# Patient Record
Sex: FEMALE | Race: WHITE | NOT HISPANIC OR LATINO | Employment: FULL TIME | URBAN - METROPOLITAN AREA
[De-identification: names, ages, dates, MRNs, and addresses within clinical notes are randomized per-mention and may not be internally consistent; named-entity substitution may affect disease eponyms.]

---

## 2017-08-31 ENCOUNTER — HOSPITAL ENCOUNTER (OUTPATIENT)
Facility: MEDICAL CENTER | Age: 47
End: 2017-08-31
Attending: NURSE PRACTITIONER
Payer: COMMERCIAL

## 2017-08-31 ENCOUNTER — OFFICE VISIT (OUTPATIENT)
Dept: MEDICAL GROUP | Facility: PHYSICIAN GROUP | Age: 47
End: 2017-08-31
Payer: COMMERCIAL

## 2017-08-31 VITALS
BODY MASS INDEX: 27.85 KG/M2 | OXYGEN SATURATION: 95 % | SYSTOLIC BLOOD PRESSURE: 116 MMHG | HEIGHT: 69 IN | TEMPERATURE: 98.7 F | DIASTOLIC BLOOD PRESSURE: 62 MMHG | HEART RATE: 82 BPM | WEIGHT: 188 LBS

## 2017-08-31 DIAGNOSIS — K90.0 CELIAC DISEASE: ICD-10-CM

## 2017-08-31 DIAGNOSIS — Z76.89 ENCOUNTER TO ESTABLISH CARE: ICD-10-CM

## 2017-08-31 DIAGNOSIS — K92.1 BLOOD IN STOOL: ICD-10-CM

## 2017-08-31 DIAGNOSIS — R39.89 ABNORMAL URINE COLOR: ICD-10-CM

## 2017-08-31 DIAGNOSIS — Z00.00 PREVENTATIVE HEALTH CARE: ICD-10-CM

## 2017-08-31 LAB
APPEARANCE UR: CLEAR
BILIRUB UR STRIP-MCNC: NORMAL MG/DL
COLOR UR AUTO: YELLOW
GLUCOSE UR STRIP.AUTO-MCNC: NORMAL MG/DL
KETONES UR STRIP.AUTO-MCNC: NORMAL MG/DL
LEUKOCYTE ESTERASE UR QL STRIP.AUTO: NORMAL
NITRITE UR QL STRIP.AUTO: NORMAL
PH UR STRIP.AUTO: 7.5 [PH] (ref 5–8)
PROT UR QL STRIP: NORMAL MG/DL
RBC UR QL AUTO: NORMAL
SP GR UR STRIP.AUTO: 1
UROBILINOGEN UR STRIP-MCNC: NORMAL MG/DL

## 2017-08-31 PROCEDURE — 99205 OFFICE O/P NEW HI 60 MIN: CPT | Performed by: NURSE PRACTITIONER

## 2017-08-31 PROCEDURE — 81002 URINALYSIS NONAUTO W/O SCOPE: CPT | Performed by: NURSE PRACTITIONER

## 2017-08-31 PROCEDURE — 82274 ASSAY TEST FOR BLOOD FECAL: CPT

## 2017-08-31 NOTE — PROGRESS NOTES
Chief Complaint   Patient presents with   • Stool Color Change     brown urine/trouble loosing weight/blood in stool-marbella color//feels nausea after eating x3 days     Britt Bethea is a 46 y.o. female here to establish care and for evaluation and management of the following:  HPI:    Previous PCP [place], relocated in May  Has been having trouble with weight loss. Tried hcg injections and diet for about 6 weeks with local office, not helpful. Weight then was about 203 Also tried medication but did not tolerate. Meds. Having trouble losing weight. Cross fit every night, very healthy eating. 7 months without exercise and with stress of moving. She gained about 40 pounds during that time. Today 186.8 at home this morning. 145 is ideal, was around 150 before move. Weight in 170's about 5 years ago told she was insulin resistant. She started weight loss meal replacement but stopped the other day when she discovered it was not gluten free. She's been having some blood in stool since Monday. Small amount on paper. Denies black tarry stool, NSAID use.  Current medicines (including changes today)  No current outpatient prescriptions on file.     No current facility-administered medications for this visit.      She  has a past medical history of Celiac disease and History of insulin resistance (2012).  She  has a past surgical history that includes hysterectomy, vaginal (2010) and appendectomy (1999).  Social History     Social History   • Marital status:      Spouse name: N/A   • Number of children: 3   • Years of education: N/A     Occupational History   • [employer]     Social History Main Topics   • Smoking status: Never Smoker   • Smokeless tobacco: Never Used   • Alcohol use No   • Drug use: No   • Sexual activity: Yes     Partners: Male     Other Topics Concern   • Not on file     Social History Narrative   • No narrative on file     History reviewed. No pertinent family history.  No family  "status information on file.       ROS  Constitutional: Negative for fever, chills, and unexplained weight loss.   HENT: Negative for ear pain, nosebleeds, and sore throat.  Eyes: Negative for changes in vision.   Respiratory: Negative for increased cough, sputum production, or wheezing.   Cardiovascular: Negative for chest pain, palpitations.   Gastrointestinal: Negative for constipation, diarrhea.   Genitourinary: Negative for dysuria.   Musculoskeletal: Negative for unusual joint pain or myalgias.   Skin: Negative for rash and itching.   Neurological: Negative for dizziness, tremors, focal weakness.   All other systems reviewed and are negative except as in HPI.    Health maintenance reviewed and updated. Records requested, mammogram prior to relocating.     Objective:   Blood pressure 116/62, pulse 82, temperature 37.1 °C (98.7 °F), height 1.753 m (5' 9\"), weight 85.3 kg (188 lb), SpO2 95 %. Body mass index is 27.76 kg/m².  Physical Exam:  Constitutional: Alert, oriented, in no acute distress.  Pleasant and cooperative with the examination.  Psych: Eye contact is good, speech goal directed, affect calm, normal judgement and insight.  Skin: Warm, dry, no rashes in visible areas.  HEENT: PER, conjunctiva and sclera clear.  Nares patent with no significant congestion or drainage.  Neck: Supple, trachea midline.   Lungs: Normal effort and respirations. Clear to auscultation bilaterally.  Abdomen: Soft, non-tender, no palpable masses. No CVAT  CV: Regular rate and rhythm.  Lower extremities: no edema, pulses intact.  Neurological:  Grossly intact.        Assessment and Plan:   The following treatment plan was discussed      1. Abnormal urine color    - POCT Urinalysis - no abnormalities    2. Blood in stool  Differential diagnosis, natural history, treatment options, supportive care, and indications for immediate follow-up discussed at length.   - REFERRAL TO GASTROENTEROLOGY  - OCCULT BLOOD FECES IMMUNOASSAY; " Future    3. Celiac disease  Strict gluten free diet.  - REFERRAL TO GASTROENTEROLOGY    4. Preventative health care    - VITAMIN D,25 HYDROXY; Future  - CBC WITH DIFFERENTIAL; Future  - LIPID PROFILE; Future  - COMP METABOLIC PANEL; Future  - TSH WITH REFLEX TO FT4; Future    5. Encounter to establish care    Records reviewed and/or requested.   Followup: Return if symptoms worsen or fail to improve.  Sooner if needed or with any new problems.       60 minutes was spent with this patient and greater than 50% of this time was spent in face to face review, consultation, counseling, and arranging future evaluation and follow up of medical conditions and care. Questions were answered regarding her diagnoses in regards to current management, specialty providers, symptom control, and future planning.   Take all medications as directed.  Report any side effects of medications.   Report any new symptoms.  Follow up as advised.  Have labs or other studies as ordered done as directed prior to follow up.  Please keep all appointments for any referrals given.    Please note this dictation was created using voice recognition software. Every reasonable attempt has been made to correct obvious errors, however there may be errors of grammar and possibly content that were not discovered before finalizing the note.

## 2017-09-01 ENCOUNTER — HOSPITAL ENCOUNTER (OUTPATIENT)
Dept: LAB | Facility: MEDICAL CENTER | Age: 47
End: 2017-09-01
Attending: NURSE PRACTITIONER
Payer: COMMERCIAL

## 2017-09-01 DIAGNOSIS — Z00.00 PREVENTATIVE HEALTH CARE: ICD-10-CM

## 2017-09-01 LAB
25(OH)D3 SERPL-MCNC: 29 NG/ML (ref 30–100)
ALBUMIN SERPL BCP-MCNC: 4.5 G/DL (ref 3.2–4.9)
ALBUMIN/GLOB SERPL: 1.4 G/DL
ALP SERPL-CCNC: 81 U/L (ref 30–99)
ALT SERPL-CCNC: 21 U/L (ref 2–50)
ANION GAP SERPL CALC-SCNC: 7 MMOL/L (ref 0–11.9)
AST SERPL-CCNC: 18 U/L (ref 12–45)
BASOPHILS # BLD AUTO: 1.2 % (ref 0–1.8)
BASOPHILS # BLD: 0.09 K/UL (ref 0–0.12)
BILIRUB SERPL-MCNC: 0.4 MG/DL (ref 0.1–1.5)
BUN SERPL-MCNC: 13 MG/DL (ref 8–22)
CALCIUM SERPL-MCNC: 10 MG/DL (ref 8.5–10.5)
CHLORIDE SERPL-SCNC: 105 MMOL/L (ref 96–112)
CHOLEST SERPL-MCNC: 169 MG/DL (ref 100–199)
CO2 SERPL-SCNC: 26 MMOL/L (ref 20–33)
CREAT SERPL-MCNC: 0.74 MG/DL (ref 0.5–1.4)
EOSINOPHIL # BLD AUTO: 0.61 K/UL (ref 0–0.51)
EOSINOPHIL NFR BLD: 8.3 % (ref 0–6.9)
ERYTHROCYTE [DISTWIDTH] IN BLOOD BY AUTOMATED COUNT: 48.2 FL (ref 35.9–50)
FASTING STATUS PATIENT QL REPORTED: NORMAL
GFR SERPL CREATININE-BSD FRML MDRD: >60 ML/MIN/1.73 M 2
GLOBULIN SER CALC-MCNC: 3.2 G/DL (ref 1.9–3.5)
GLUCOSE SERPL-MCNC: 84 MG/DL (ref 65–99)
HCT VFR BLD AUTO: 47.3 % (ref 37–47)
HDLC SERPL-MCNC: 40 MG/DL
HGB BLD-MCNC: 15.4 G/DL (ref 12–16)
IMM GRANULOCYTES # BLD AUTO: 0.02 K/UL (ref 0–0.11)
IMM GRANULOCYTES NFR BLD AUTO: 0.3 % (ref 0–0.9)
LDLC SERPL CALC-MCNC: 111 MG/DL
LYMPHOCYTES # BLD AUTO: 2.6 K/UL (ref 1–4.8)
LYMPHOCYTES NFR BLD: 35.3 % (ref 22–41)
MCH RBC QN AUTO: 30.9 PG (ref 27–33)
MCHC RBC AUTO-ENTMCNC: 32.6 G/DL (ref 33.6–35)
MCV RBC AUTO: 94.8 FL (ref 81.4–97.8)
MONOCYTES # BLD AUTO: 0.52 K/UL (ref 0–0.85)
MONOCYTES NFR BLD AUTO: 7.1 % (ref 0–13.4)
NEUTROPHILS # BLD AUTO: 3.52 K/UL (ref 2–7.15)
NEUTROPHILS NFR BLD: 47.8 % (ref 44–72)
NRBC # BLD AUTO: 0 K/UL
NRBC BLD AUTO-RTO: 0 /100 WBC
PLATELET # BLD AUTO: 258 K/UL (ref 164–446)
PMV BLD AUTO: 11.7 FL (ref 9–12.9)
POTASSIUM SERPL-SCNC: 4.1 MMOL/L (ref 3.6–5.5)
PROT SERPL-MCNC: 7.7 G/DL (ref 6–8.2)
RBC # BLD AUTO: 4.99 M/UL (ref 4.2–5.4)
SODIUM SERPL-SCNC: 138 MMOL/L (ref 135–145)
TRIGL SERPL-MCNC: 92 MG/DL (ref 0–149)
TSH SERPL DL<=0.005 MIU/L-ACNC: 1.41 UIU/ML (ref 0.3–3.7)
WBC # BLD AUTO: 7.4 K/UL (ref 4.8–10.8)

## 2017-09-01 PROCEDURE — 80061 LIPID PANEL: CPT

## 2017-09-01 PROCEDURE — 82306 VITAMIN D 25 HYDROXY: CPT

## 2017-09-01 PROCEDURE — 84443 ASSAY THYROID STIM HORMONE: CPT

## 2017-09-01 PROCEDURE — 80053 COMPREHEN METABOLIC PANEL: CPT

## 2017-09-01 PROCEDURE — 36415 COLL VENOUS BLD VENIPUNCTURE: CPT

## 2017-09-01 PROCEDURE — 85025 COMPLETE CBC W/AUTO DIFF WBC: CPT

## 2017-09-03 DIAGNOSIS — K92.1 BLOOD IN STOOL: ICD-10-CM

## 2017-09-03 LAB — HEMOCCULT STL QL IA: NEGATIVE

## 2018-01-30 ENCOUNTER — OFFICE VISIT (OUTPATIENT)
Dept: MEDICAL GROUP | Facility: PHYSICIAN GROUP | Age: 48
End: 2018-01-30
Payer: COMMERCIAL

## 2018-01-30 VITALS
DIASTOLIC BLOOD PRESSURE: 68 MMHG | HEIGHT: 69 IN | OXYGEN SATURATION: 97 % | RESPIRATION RATE: 16 BRPM | SYSTOLIC BLOOD PRESSURE: 118 MMHG | HEART RATE: 64 BPM | WEIGHT: 202 LBS | TEMPERATURE: 98.1 F | BODY MASS INDEX: 29.92 KG/M2

## 2018-01-30 DIAGNOSIS — F98.8 ADD (ATTENTION DEFICIT DISORDER) WITHOUT HYPERACTIVITY: ICD-10-CM

## 2018-01-30 PROCEDURE — 99215 OFFICE O/P EST HI 40 MIN: CPT | Performed by: NURSE PRACTITIONER

## 2018-01-30 RX ORDER — LISDEXAMFETAMINE DIMESYLATE CAPSULES 30 MG/1
30 CAPSULE ORAL EVERY MORNING
Qty: 30 CAP | Refills: 0 | Status: SHIPPED | OUTPATIENT
Start: 2018-01-30 | End: 2018-03-01

## 2018-01-30 ASSESSMENT — PATIENT HEALTH QUESTIONNAIRE - PHQ9: CLINICAL INTERPRETATION OF PHQ2 SCORE: 0

## 2018-01-30 NOTE — LETTER
[facility]  CRISTY Suárez  [address]  [place] [address]  Fax: [phone]   Authorization for Release/Disclosure of   Protected Health Information   Name: CASTILLO COPPOLA : 1970 SSN: [id number]   Address: [address] [place] [address]  [place] [address] Phone:    [phone] (home)    I authorize the entity listed below to release/disclose the PHI below to:   [facility]/CRISTY Suárez and CRISTY Suárez   Provider or Entity Name:     Address   City, State, Zip   Phone:      Fax:     Reason for request: continuity of care   Information to be released:    [  ] LAST COLONOSCOPY,  including any PATH REPORT and follow-up  [  ] LAST FIT/COLOGUARD RESULT [  ] LAST DEXA  [  ] LAST MAMMOGRAM  [  ] LAST PAP  [  ] LAST LABS [  ] RETINA EXAM REPORT  [  ] IMMUNIZATION RECORDS  [  ] Release all info      [  ] Check here and initial the line next to each item to release ALL health information INCLUDING  _____ Care and treatment for drug and / or alcohol abuse  _____ HIV testing, infection status, or AIDS  _____ Genetic Testing    DATES OF SERVICE OR TIME PERIOD TO BE DISCLOSED: _____________  I understand and acknowledge that:  * This Authorization may be revoked at any time by you in writing, except if your health information has already been used or disclosed.  * Your health information that will be used or disclosed as a result of you signing this authorization could be re-disclosed by the recipient. If this occurs, your re-disclosed health information may no longer be protected by State or Federal laws.  * You may refuse to sign this Authorization. Your refusal will not affect your ability to obtain treatment.  * This Authorization becomes effective upon signing and will  on (date) __________.      If no date is indicated, this Authorization will  one (1) year from the signature date.    Name: Castillo Coppola    Signature:   Date:     2018            PLEASE FAX REQUESTED RECORDS BACK TO: [phone]

## 2018-01-30 NOTE — PROGRESS NOTES
"Subjective:     Chief Complaint   Patient presents with   • Other     difficulty focusing      Britt Bethea is a 47 y.o. female here today for evaluation and management of issues listed below.    She has been having trouble concentrating at work and this has been affecting the quality of her work. She has been forgetting things and had more trouble completing tasks. Stress at work has been increasing over the last 3 months or so. The increasing stress makes it more difficult to stay organized and get things done. This has become problematic since the  stress at work escalated. Started working in May and managed fine until about September/October. She also has noticed things at home are also not well managed. She was diagnosed with ADD by previous PCP when she was working on graduate degree (her son also diagnosed at the time).   Sleep and appetite unchanged. She was treated with Vyvanse and responded well to medication without adverse effects. She hasn't had any medication for the last few years.     1. ADD (attention deficit disorder) without hyperactivity        ROS   Denies HA, chest pain, increased shortness of breath, abdominal pain, bladder or bowel changes, lower extremity edema. All other pertinent systems reviewed and are negative except as in HPI.    Allergies: Patient has no known allergies.  Current medicines (including changes today)  Current Outpatient Prescriptions   Medication Sig Dispense Refill   • lisdexamfetamine (VYVANSE) 30 MG capsule Take 1 Cap by mouth every morning for 30 days. 30 Cap 0     No current facility-administered medications for this visit.        She  has a past medical history of Celiac disease and History of insulin resistance (2012).      Health Maintenance: Reviewed and updated.    Objective:   Blood pressure 118/68, pulse 64, temperature 36.7 °C (98.1 °F), resp. rate 16, height 1.753 m (5' 9\"), weight 91.6 kg (202 lb), SpO2 97 %. Body mass index is 29.83 kg/m².  Physical " Exam   Constitutional: Non-toxic appearance. Alert, no acute distress. Appears well-developed and well-nourished. Pleasant and cooperative with the examination.  Eye contact is good, affect calm.  HENT:   Head: Normocephalic.   Skin: Warm, dry, no rashes in visible areas. No diaphoresis.    Eyes: Pupils equal, round, reactive to light. Conjunctivae and sclera clear, lids normal.  ENT: Nose normal. Pinna normal.  External auditory canals normal, TM's intact.  Uvula midline. No posterior oropharyngeal erythema or edema.  Neck: Supple, trachea midline.  Lungs: Normal effort and respirations. No accessory muscle usage. Clear to auscultation bilaterally.   Abdomen: No CVAT   CV: Regular rate and rhythm.  MS/Ext: Steady gait, no edema.  Vitals reviewed.       Results reviewed:  ASRS completed and reviewed  Assessment and Plan:   Treatment:    Britt was seen today for other.    Diagnoses and all orders for this visit:    ADD (attention deficit disorder) without hyperactivity  Not well controlled. natural history, treatment options, supportive care, and indications for immediate follow-up discussed at length.  reviewed. Records requested. Trial of medication and follow up one month. Reviewed risks and benefits of medication.     -     lisdexamfetamine (VYVANSE) 30 MG capsule; Take 1 Cap by mouth every morning for 30 days.    Other orders  -     Obtain Results: Other (see comment) (office notes); Obtain Results From: Other (see comment) ([facility], [facility]        Followup: Return in about 1 month (around 2/28/2018). Sooner should new symptoms or problems arise, or as previously scheduled.     40 minutes face to face time with the patient during this encounter and greater than 50% of this time involved  review, consultation, counseling, education, and arranging future evaluation and treatment of medical conditions and care. Questions were answered regarding her diagnoses in regards to current  management, specialty providers, symptom control, and future planning.      Reviewed side effects, risks, and benefits of medications prescribed today.  Advised to take all medications as instructed and report any side effects.   The patient voices understanding and agrees.  Report any new or worsening symptoms.  Have labs or other diagnostic studies prior to follow up.  Keep all appointments for any referrals given.        Please note this dictation was created using voice recognition software. Every reasonable attempt has been made to correct obvious errors, however there may be errors of grammar and possibly content that were not discovered before finalizing the note.

## 2018-02-28 ENCOUNTER — OFFICE VISIT (OUTPATIENT)
Dept: MEDICAL GROUP | Facility: LAB | Age: 48
End: 2018-02-28
Payer: COMMERCIAL

## 2018-02-28 VITALS
OXYGEN SATURATION: 96 % | DIASTOLIC BLOOD PRESSURE: 78 MMHG | TEMPERATURE: 98.3 F | HEIGHT: 69 IN | WEIGHT: 195 LBS | HEART RATE: 111 BPM | RESPIRATION RATE: 12 BRPM | BODY MASS INDEX: 28.88 KG/M2 | SYSTOLIC BLOOD PRESSURE: 98 MMHG

## 2018-02-28 DIAGNOSIS — F98.8 ADD (ATTENTION DEFICIT DISORDER) WITHOUT HYPERACTIVITY: ICD-10-CM

## 2018-02-28 DIAGNOSIS — Z12.31 ENCOUNTER FOR SCREENING MAMMOGRAM FOR BREAST CANCER: ICD-10-CM

## 2018-02-28 PROCEDURE — 99214 OFFICE O/P EST MOD 30 MIN: CPT | Performed by: NURSE PRACTITIONER

## 2018-02-28 RX ORDER — LISDEXAMFETAMINE DIMESYLATE CAPSULES 70 MG/1
70 CAPSULE ORAL EVERY MORNING
Qty: 30 CAP | Refills: 0 | Status: SHIPPED | OUTPATIENT
Start: 2018-03-01 | End: 2018-03-16 | Stop reason: SDUPTHER

## 2018-02-28 NOTE — PROGRESS NOTES
"Chief Complaint   Patient presents with   • ADHD     pt would like to take the higher dose, she had previously been on 70 mg was started at a lower dose        HPI  Britt is a 47-year-old female, same day access of [employer]. She is here to follow-up on reinitiation of Vyvanse - this is a new issue to me today. Chronic history of attention deficit disorder. Was previously on Vyvanse 2 yrs ago during her master's program - worked very well then.  Went off Vyvanse b/c of job situation (low stress), moved out here last May and now is a very stressful / high demanding job.  Was started on 30 mg Vyvanse, use to take 70 mg.  Still feeling overwhelmed in particular situations at work and would like to increase to 70 mg of Vyvanse. On 30 mg of Vyvanse she states that she is not feeling as overwhelmed at home - able to organize her life after work instead of \"veg out.\"  Sleeping ok.  Denies insomnia, heart palpitations.    Has 3 children and father with ADD.        Past medical, surgical, family, and social history is reviewed and updated in Epic chart by me today.   Medications and allergies reviewed and updated in Epic chart by me today.     ROS:   As documented in history of present illness above    Exam:  Blood pressure (!) 98/78, pulse (!) 111, temperature 36.8 °C (98.3 °F), resp. rate 12, height 1.753 m (5' 9\"), weight 88.5 kg (195 lb), SpO2 96 %.  Gen. appears healthy in no distress   Skin appropriate for sex and age   HEENT unremarkable   Neck no adenopathy, no nodules or masses palpable   Lungs clear   Heart regular   Extremities no edema   Neuro gait and station normal   Psych appropriate, calm, interactive and well groomed      A/P:  1. ADD (attention deficit disorder) without hyperactivity  -She was given a 30 day refill of Vyvanse. Per her  profile, last refill was on January 30 by April. Follow-up was made for the patient for 30 days with April.  - lisdexamfetamine (VYVANSE) 70 MG capsule; Take 1 Cap " by mouth every morning for 30 days.  Dispense: 30 Cap; Refill: 0    2. Encounter for screening mammogram for breast cancer  -Recommend updated mammogram.  - MA-SCREEN MAMMO W/CAD-BILAT; Future

## 2018-03-15 ENCOUNTER — TELEPHONE (OUTPATIENT)
Dept: MEDICAL GROUP | Facility: PHYSICIAN GROUP | Age: 48
End: 2018-03-15

## 2018-03-15 NOTE — TELEPHONE ENCOUNTER
Future Appointments       Provider Department Center    3/16/2018 10:05 AM Chuyita Genao P.A.-C. [facility]    3/23/2018 3:00 PM RUPERT HAIR 1 [facility] MAMMOGRAPHY [facility] PATIENT PRE-VISIT PLANNING     Note: Patient will not be contacted if there is no indication to call.     1.  Reviewed notes from the last few office visits within the medical group: Yes    2.  If any orders were placed at last visit or intended to be done for this visit (i.e. 6 mos follow-up), do we have Results/Consult Notes?        •  Labs - Labs were not ordered at last office visit.       •  Imaging - Imaging ordered, NOT completed. Patient advised to complete prior to next appointment. MAMMO scheduled        •  Referrals - No referrals were ordered at last office visit.    3. Is this appointment scheduled as a Hospital Follow-Up? No    4.  Immunizations were updated in Epic using WebIZ?: Yes       •  Web Iz Recommendations: FLU and TDAP    5.  Patient is due for the following Health Maintenance Topics:   Health Maintenance Due   Topic Date Due   • IMM DTaP/Tdap/Td Vaccine (1 - Tdap) 10/20/1991   • PAP SMEAR  10/20/1993   • MAMMOGRAM  10/20/2012   • IMM INFLUENZA (1) 09/01/2017       6.  MDX printed and highlighted for Provider? N\A BCBS     7.  Patient was informed to arrive 15 min prior to their scheduled appointment and bring in their medication bottles.

## 2018-03-16 ENCOUNTER — OFFICE VISIT (OUTPATIENT)
Dept: MEDICAL GROUP | Facility: PHYSICIAN GROUP | Age: 48
End: 2018-03-16
Payer: COMMERCIAL

## 2018-03-16 VITALS
HEART RATE: 97 BPM | BODY MASS INDEX: 29.92 KG/M2 | OXYGEN SATURATION: 96 % | SYSTOLIC BLOOD PRESSURE: 100 MMHG | HEIGHT: 69 IN | DIASTOLIC BLOOD PRESSURE: 70 MMHG | TEMPERATURE: 99 F | WEIGHT: 202 LBS

## 2018-03-16 DIAGNOSIS — F90.9 ADULT ADHD (ATTENTION DEFICIT HYPERACTIVITY DISORDER): ICD-10-CM

## 2018-03-16 PROCEDURE — 99214 OFFICE O/P EST MOD 30 MIN: CPT | Performed by: PHYSICIAN ASSISTANT

## 2018-03-16 RX ORDER — LISDEXAMFETAMINE DIMESYLATE CAPSULES 70 MG/1
70 CAPSULE ORAL EVERY MORNING
Qty: 30 CAP | Refills: 0 | Status: SHIPPED | OUTPATIENT
Start: 2018-05-01 | End: 2018-05-31

## 2018-03-16 RX ORDER — LISDEXAMFETAMINE DIMESYLATE CAPSULES 70 MG/1
70 CAPSULE ORAL EVERY MORNING
Qty: 30 CAP | Refills: 0 | Status: SHIPPED | OUTPATIENT
Start: 2018-05-31 | End: 2018-06-22 | Stop reason: SDUPTHER

## 2018-03-16 RX ORDER — LISDEXAMFETAMINE DIMESYLATE CAPSULES 70 MG/1
70 CAPSULE ORAL EVERY MORNING
Qty: 30 CAP | Refills: 0 | Status: SHIPPED | OUTPATIENT
Start: 2018-04-01 | End: 2018-05-01

## 2018-03-16 NOTE — PROGRESS NOTES
Subjective:   Britt Bethea is a 47 y.o. female here today for ADHD follow-up. Is an established patient of [facility].    HPI:    Adult ADHD (attention deficit hyperactivity disorder)  Patient presents to the office today for a follow-up on ADHD. This is a patient of April Zuluaga. This is my first time seeing patient for this issue. Britt states that she first started taking Vyvanse during her master's program. She was feeling very overwhelmed with all of the studying and felt unable to cope or concentrate on anything. The medication worked very well for her and she used it for 2 years. Her insurance then stop paying for the medication so she stopped it. 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 She states that her job is very demanding with lots of complicated documentation which she was having problems doing. Her PCP put her back on the Vyvanse at the end of January at a 30 mg dose. She was increased to the 70 mg dose that she was on previously at the end of last month and she is here today for refills. She states that she is tolerating the medication very well and that it works quite well for her symptoms. It allows her to do her job in the way that it needs to be done. The only side effect that she has had is a bit of fogginess first thing in the morning. She denies any excessive sedation, dizziness, chest pain, palpitations, or headaches. She is currently taking the Vyvanse first thing in the morning on her way to work, typically between 5:45 and 6 AM. She endorses a strong family history of ADHD. Both her son and daughter are actually on Vyvanse and her father has ADHD as well.       Current medicines (including changes today)  Current Outpatient Prescriptions   Medication Sig Dispense Refill  "  • [START ON 4/1/2018] lisdexamfetamine (VYVANSE) 70 MG capsule Take 1 Cap by mouth every morning for 30 days. 30 Cap 0   • [START ON 5/1/2018] lisdexamfetamine (VYVANSE) 70 MG capsule Take 1 Cap by mouth every morning for 30 days. 30 Cap 0   • [START ON 5/31/2018] lisdexamfetamine (VYVANSE) 70 MG capsule Take 1 Cap by mouth every morning for 30 days. 30 Cap 0     No current facility-administered medications for this visit.      She  has a past medical history of Celiac disease and History of insulin resistance (2012).    ROS  As per HPI.       Objective:     Blood pressure 100/70, pulse 97, temperature 37.2 °C (99 °F), height 1.753 m (5' 9\"), weight 91.6 kg (202 lb), SpO2 96 %. Body mass index is 29.83 kg/m².     Physical Exam:  Constitutional: Alert, well-appearing, no distress.  Skin: Warm, dry, good turgor, no rashes in visible areas.  Eye: Pupils are equal and round, conjunctiva clear, lids normal.  ENMT: Lips without lesions, moist mucus membranes.  Neck: No masses. No submandibular or cervical lymphadenopathy. No palpable thyromegaly.  Respiratory: Unlabored respiratory effort, lungs clear to auscultation, no wheezes, no rhonchi.  Cardiovascular: Normal S1, S2, no murmur.    Assessment and Plan:   The following treatment plan was discussed    1. Adult ADHD (attention deficit hyperactivity disorder)  Chronic issue, well-controlled with daily Vyvanse. Patient doing well on medication with minimal side effects. Mer reviewed: last fill date on 3/2/18--#30 with no refills. 3 scripts provided to patient with 30 day supply on each. Able to fill first script on 4/1/18. Should follow up again for ADHD with PCP in 3 months for further refills.  - lisdexamfetamine (VYVANSE) 70 MG capsule; Take 1 Cap by mouth every morning for 30 days.  Dispense: 30 Cap; Refill: 0  - lisdexamfetamine (VYVANSE) 70 MG capsule; Take 1 Cap by mouth every morning for 30 days.  Dispense: 30 Cap; Refill: 0  - lisdexamfetamine (VYVANSE) " 70 MG capsule; Take 1 Cap by mouth every morning for 30 days.  Dispense: 30 Cap; Refill: 0      Followup: Return in about 3 months (around 6/16/2018) for f/u ADHD; Short.    Chuyita Genao P.A.-C.

## 2018-06-21 ENCOUNTER — TELEPHONE (OUTPATIENT)
Dept: MEDICAL GROUP | Facility: LAB | Age: 48
End: 2018-06-21

## 2018-06-21 NOTE — TELEPHONE ENCOUNTER
ESTABLISHED PATIENT PRE-VISIT PLANNING     Note: Patient will not be contacted if there is no indication to call.     1.  Reviewed notes from the last few office visits within the medical group: Yes    2.  If any orders were placed at last visit or intended to be done for this visit (i.e. 6 mos follow-up), do we have Results/Consult Notes?        •  Labs - Labs were not ordered at last office visit.       •  Imaging - Imaging was not ordered at last office visit.       •  Referrals - No referrals were ordered at last office visit.    3. Is this appointment scheduled as a Hospital Follow-Up? No    4.  Immunizations were updated in Epic using WebIZ?: No WebIZ record       •  Web Iz Recommendations:     5.  Patient is due for the following Health Maintenance Topics:   Health Maintenance Due   Topic Date Due   • IMM DTaP/Tdap/Td Vaccine (1 - Tdap) 10/20/1991   • PAP SMEAR  10/20/1993   • MAMMOGRAM  10/20/2012           6.  MDX printed for Provider? NO    7.  Patient was informed to arrive 15 min prior to their scheduled appointment and bring in their medication bottles.

## 2018-06-22 ENCOUNTER — HOSPITAL ENCOUNTER (OUTPATIENT)
Dept: RADIOLOGY | Facility: MEDICAL CENTER | Age: 48
End: 2018-06-22
Attending: INTERNAL MEDICINE
Payer: COMMERCIAL

## 2018-06-22 ENCOUNTER — OFFICE VISIT (OUTPATIENT)
Dept: MEDICAL GROUP | Facility: LAB | Age: 48
End: 2018-06-22
Payer: COMMERCIAL

## 2018-06-22 VITALS
DIASTOLIC BLOOD PRESSURE: 78 MMHG | SYSTOLIC BLOOD PRESSURE: 108 MMHG | HEIGHT: 69 IN | HEART RATE: 92 BPM | BODY MASS INDEX: 29.47 KG/M2 | WEIGHT: 199 LBS | OXYGEN SATURATION: 96 % | RESPIRATION RATE: 16 BRPM | TEMPERATURE: 97.1 F

## 2018-06-22 DIAGNOSIS — R76.11 PPD POSITIVE: ICD-10-CM

## 2018-06-22 DIAGNOSIS — F90.9 ADULT ADHD (ATTENTION DEFICIT HYPERACTIVITY DISORDER): ICD-10-CM

## 2018-06-22 DIAGNOSIS — K90.0 CELIAC DISEASE: ICD-10-CM

## 2018-06-22 DIAGNOSIS — R73.03 PREDIABETES: ICD-10-CM

## 2018-06-22 DIAGNOSIS — J45.30 MILD PERSISTENT ASTHMA WITHOUT COMPLICATION: ICD-10-CM

## 2018-06-22 DIAGNOSIS — Z00.00 ANNUAL PHYSICAL EXAM: ICD-10-CM

## 2018-06-22 DIAGNOSIS — E55.9 VITAMIN D DEFICIENCY: ICD-10-CM

## 2018-06-22 DIAGNOSIS — Z12.31 ENCOUNTER FOR SCREENING MAMMOGRAM FOR BREAST CANCER: ICD-10-CM

## 2018-06-22 PROCEDURE — 99204 OFFICE O/P NEW MOD 45 MIN: CPT | Performed by: INTERNAL MEDICINE

## 2018-06-22 PROCEDURE — 71046 X-RAY EXAM CHEST 2 VIEWS: CPT

## 2018-06-22 RX ORDER — LISDEXAMFETAMINE DIMESYLATE CAPSULES 70 MG/1
70 CAPSULE ORAL EVERY MORNING
Qty: 30 CAP | Refills: 0 | Status: SHIPPED | OUTPATIENT
Start: 2018-08-27 | End: 2018-09-18 | Stop reason: SDUPTHER

## 2018-06-22 RX ORDER — LISDEXAMFETAMINE DIMESYLATE CAPSULES 70 MG/1
70 CAPSULE ORAL EVERY MORNING
Qty: 30 CAP | Refills: 0 | Status: SHIPPED | OUTPATIENT
Start: 2018-06-29 | End: 2018-06-22 | Stop reason: SDUPTHER

## 2018-06-22 RX ORDER — ALBUTEROL SULFATE 90 UG/1
2 AEROSOL, METERED RESPIRATORY (INHALATION) EVERY 6 HOURS PRN
Qty: 8.5 G | Refills: 2 | Status: SHIPPED | OUTPATIENT
Start: 2018-06-22 | End: 2018-07-22

## 2018-06-22 RX ORDER — LISDEXAMFETAMINE DIMESYLATE CAPSULES 70 MG/1
70 CAPSULE ORAL EVERY MORNING
Qty: 30 CAP | Refills: 0 | Status: SHIPPED | OUTPATIENT
Start: 2018-07-28 | End: 2018-06-22 | Stop reason: SDUPTHER

## 2018-06-22 NOTE — ASSESSMENT & PLAN NOTE
This is a pleasant 47-year-old lady who is here today to establish care with a new primary care provider.  We discussed about her health maintenance as below.:  Mammograms: Most recent 2017 normal.   PAP smears: Most recent  PAP 2016 at [place], pending records.  Colon cancer screening: Not due but have early colonscopy for blood in stool.   Osteoporosis screening:  Not due  Immunizations: Declined Tdap

## 2018-06-22 NOTE — LETTER
[facility]  Kelle Tovar M.D.  [address]  [place] [address]  Fax: [phone]   Authorization for Release/Disclosure of   Protected Health Information   Name: CASTILLO COPPOLA : 1970 SSN: [id number]   Address: [address] [place] [address]  [place] [address] Phone:    [phone] (home)    I authorize the entity listed below to release/disclose the PHI below to:   [facility]/Kelle Tovar M.D. and Kelle Tovar M.D.   Provider or Entity Name:  Som Child   [facility]    Address   [facility]   Phone:  [phone]    Fax:  [phone]   Reason for request: continuity of care   Information to be released:    [  ] LAST COLONOSCOPY,  including any PATH REPORT and follow-up  [  ] LAST FIT/COLOGUARD RESULT [  ] LAST DEXA  [  ] LAST MAMMOGRAM  [  ] LAST PAP  [  ] LAST LABS [  ] RETINA EXAM REPORT  [  ] IMMUNIZATION RECORDS  [  ] Release all info      [  ] Check here and initial the line next to each item to release ALL health information INCLUDING  _____ Care and treatment for drug and / or alcohol abuse  _____ HIV testing, infection status, or AIDS  _____ Genetic Testing    DATES OF SERVICE OR TIME PERIOD TO BE DISCLOSED: _____________  I understand and acknowledge that:  * This Authorization may be revoked at any time by you in writing, except if your health information has already been used or disclosed.  * Your health information that will be used or disclosed as a result of you signing this authorization could be re-disclosed by the recipient. If this occurs, your re-disclosed health information may no longer be protected by State or Federal laws.  * You may refuse to sign this Authorization. Your refusal will not affect your ability to obtain treatment.  * This Authorization becomes effective upon signing and will  on (date) __________.      If no date is indicated, this Authorization will  one (1) year from the signature date.    Name: Castillo  Lake    Signature:   Date:     6/22/2018       PLEASE FAX REQUESTED RECORDS BACK TO: [phone]

## 2018-06-22 NOTE — ASSESSMENT & PLAN NOTE
New to me, chronic controlled.  This patient is establishing her today with a new primary care provider.  She stated that she was diagnosed with ADHD in 2010 while she was in college and under a lot of stress doing her masters program.  She was started on Vyvanse and she did well for 1 year.  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 She was restarted by her previous provider on Vyvanse 70 mg every morning.  She feels this medication is quite helpful as far as her concentration and multitasking abilities.  She was struggling to perform her job before this medication.  She denied any cardiovascular side effects so far such as palpitations.  She sleeps pretty well.  She does not have any symptoms of anxiety or depression associated with it.    She claims that she still have some forgetfulness but she denies excessive sedation, dizziness, chest pain, palpitations or headache.  She typically takes her medication between 5 and 6 in the morning.  She continues to take it all week even in her weekends.

## 2018-06-23 NOTE — ASSESSMENT & PLAN NOTE
New to me, chronic controlled.  Her most recent vitamin D level was in the 20s and 2017.  She has a started over-the-counter therapy for short period of time and it got interrupted.

## 2018-06-23 NOTE — PROGRESS NOTES
Chief Complaint   Patient presents with   • Establish Care  ADHD Meds       Subjective:     History of Present Illness: Patient is a 47 y.o. female. This pleasant patient is here today to establish care with a new primary care provider and address medical problems listed below.    Adult ADHD (attention deficit hyperactivity disorder)  New to me, chronic controlled.  This patient is establishing her today with a new primary care provider.  She stated that she was diagnosed with ADHD in 2010 while she was in college and under a lot of stress doing her masters program.  She was started on Vyvanse and she did well for 1 year.  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 She was restarted by her previous provider on Vyvanse 70 mg every morning.  She feels this medication is quite helpful as far as her concentration and multitasking abilities.  She was struggling to perform her job before this medication.  She denied any cardiovascular side effects so far such as palpitations.  She sleeps pretty well.  She does not have any symptoms of anxiety or depression associated with it.    She claims that she still have some forgetfulness but she denies excessive sedation, dizziness, chest pain, palpitations or headache.  She typically takes her medication between 5 and 6 in the morning.  She continues to take it all week even in her weekends.          Annual physical exam  This is a pleasant 47-year-old lady who is here today to establish care with a new primary care provider.  We discussed about her health maintenance as below.:  Mammograms: Most recent 2017 normal.   PAP smears: Most recent  PAP 2016 at [place], pending records.  Colon cancer screening: Not due but have early colonscopy for blood in stool.   Osteoporosis screening:  Not due  Immunizations: Declined Tdap      Celiac disease  New to me, chronic  controlled.  She stated that she was diagnosed by a celiac disease by a GI provider while she was in [place].  She used to have diarrhea with bloody stools consistently and then she was tested for celiac disease and she had an endoscopy procedure.  She is currently doing well with a gluten-free diet.    Mild persistent asthma without complication  New to me, chronic controlled.  This is mainly exercise-induced asthma since childhood.  She feels winded and has wheeze every time she does strenuous exercise.  She uses as needed albuterol inhaler only on an as-needed basis.    PPD positive  New to discuss.  She stated that she tested positive for PPD by her previous employer in 2013, her chest x-ray was negative and she completed 6 months therapy of isoniazid side.  She is required by her current employer to have a chest x-ray every 2 years for clearance.  We will proceed with a new x-ray.  She denied any symptoms of cough, chest pain, shortness of breath, weight loss or loss of appetite.    Prediabetes  New to me, chronic controlled.  She stated that her previous provider told her that she has insulin resistance are in the prediabetic range.  She was started on metformin 500 mg twice a day.  She has not used it for many months and recently restarted about 3 weeks ago.  She would like to be rechecked to see her diabetes control.      Vitamin D deficiency  New to me, chronic controlled.  Her most recent vitamin D level was in the 20s and 2017.  She has a started over-the-counter therapy for short period of time and it got interrupted.      Allergies: Patient has no known allergies.    Current Outpatient Prescriptions Ordered in [facility]   Medication Sig Dispense Refill   • metFORMIN (GLUCOPHAGE) 500 MG Tab Take 1 Tab by mouth 2 times a day, with meals for 30 days. 60 Tab 6   • albuterol 108 (90 Base) MCG/ACT Aero Soln inhalation aerosol Inhale 2 Puffs by mouth every 6 hours as needed for Shortness of Breath for up to 30 days.  8.5 g 2   • [START ON 8/27/2018] lisdexamfetamine (VYVANSE) 70 MG capsule Take 1 Cap by mouth every morning for 30 days. 30 Cap 0     No current Epic-ordered facility-administered medications on file.        Past Medical History:   Diagnosis Date   • Celiac disease    • Celiac disease 6/22/2018    Diagnosed by GI in [place] by official testing and endoscopy. Doing well with gluten free diet.   • History of insulin resistance 2012   • Mild persistent asthma without complication 6/22/2018    Exercise induced asthma. Only with strenuous exercise. PRN Albuterol inhaler.    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    • Vitamin D deficiency 6/22/2018    Taking OTC.        Past Surgical History:   Procedure Laterality Date   • HYSTERECTOMY, VAGINAL  2010    heavy menses   • APPENDECTOMY  1999       Social History   Substance Use Topics   • Smoking status: Never Smoker   • Smokeless tobacco: Never Used   • Alcohol use No       Family History   Problem Relation Age of Onset   • Other Mother      DVTs, IVC filter in place   • Diabetes Father    • Other Father      gout   • No Known Problems Sister    • Diabetes Brother    • Cancer Maternal Grandmother      skin cancer   • Breast Cancer Maternal Aunt        ROS:     - Constitutional: Negative for fever, chills, unexpected weight change, and fatigue/generalized weakness.     - HEENT: Negative for headaches, vision changes, hearing changes, ear pain, ear discharge, sinus congestion, sore throat, and neck pain.      - Respiratory: Negative for cough, sputum production, dyspnea and wheezing.    - Cardiovascular: Negative for chest pain, palpitations, orthopnea, and bilateral lower extremity edema.     - Gastrointestinal: Negative for heartburn, nausea, vomiting, abdominal pain, hematochezia, melena, diarrhea, constipation, and greasy/foul-smelling stools.     - Genitourinary: Negative for  "dysuria, polyuria, hematuria, pyuria, urinary urgency, and urinary incontinence.    - Musculoskeletal: Negative for myalgias, back pain, and joint pain.     - Skin: Negative for rash, itching, cyanotic skin color change.     - Neurological: Negative for dizziness, tingling, tremors, focal sensory deficit, focal weakness and headaches.     - Endo/Heme/Allergies: Does not bruise/bleed easily.     - Psychiatric/Behavioral: Negative for depression, suicidal/homicidal ideation and memory loss.        - NOTE: All other systems reviewed and are negative, except as in HPI.       Physical Exam:     Blood pressure 108/78, pulse 92, temperature 36.2 °C (97.1 °F), resp. rate 16, height 1.753 m (5' 9.02\"), weight 90.3 kg (199 lb), SpO2 96 %, not currently breastfeeding. Body mass index is 29.37 kg/m².   General: Normal appearing. No distress.  HEENT: Normocephalic. Eyes conjunctiva clear lids without ptosis, pupils equal and reactive to light accommodation, ears normal shape and contour, canals are clear bilaterally, tympanic membranes are benign,  oropharynx is without erythema, edema or exudates.    Neck: Supple without JVD or bruit. Thyroid is not enlarged.  Pulmonary: Clear to ausculation.  Normal effort. No rales, ronchi, or wheezing.  Cardiovascular: Regular rate and rhythm without murmur. Radial pulses are intact, regular and symmetrical bilaterally.  Abdomen: Soft, nontender, nondistended. Normal bowel sounds. Liver and spleen are not palpable.  Neurologic: Grossly non-focal.  Lymph: No cervical, occipital or supraclavicular lymph nodes are palpable  Skin: Warm and dry.  No obvious lesions.  Musculoskeletal: Normal gait. No extremity cyanosis, clubbing, or edema.  Psych: Normal mood and affect. Alert and oriented x3. Judgment and insight is normal.    Data:     LABS: 2017: Results reviewed and discussed with the patient, questions answered.      Assessment and Plan:     1. Adult ADHD (attention deficit hyperactivity " disorder)  Chronic controlled. Doing well on Vyvanse.  As discussed in HPI, she is not currently experiencing side effects.  She is using it appropriately.  I reviewed her narcotic checks and she is feeling this prescription consistently once a month since January.  No co-use of controlled substances or alcohol.  We will proceed with a refill after careful counseling.  Follow-up every 3 months.  - lisdexamfetamine (VYVANSE) 70 MG capsule; Take 1 Cap by mouth every morning for 30 days.  Dispense: 30 Cap; Refill: 0    2. Mild persistent asthma without complication  Chronic controlled.  Continue with albuterol inhaler before strenuous exercise for exercise-induced asthma.  - albuterol 108 (90 Base) MCG/ACT Aero Soln inhalation aerosol; Inhale 2 Puffs by mouth every 6 hours as needed for Shortness of Breath for up to 30 days.  Dispense: 8.5 g; Refill: 2    3. Prediabetes  Chronic controlled.  Will continue with metformin 500 mg twice daily.  Will check her hemoglobin A1c and will keep in mind that she only restarted her education about 3 weeks ago.  Discussed importance of lifestyle modifications to help with insulin resistance and metabolic syndrome.  - metFORMIN (GLUCOPHAGE) 500 MG Tab; Take 1 Tab by mouth 2 times a day, with meals for 30 days.  Dispense: 60 Tab; Refill: 6  - HEMOGLOBIN A1C; Future    4. Annual physical exam  As discussed in HPI, will proceed with mammograms and will make her an appointment for Pap smears.  We reviewed her lab results from last year as well.    5. Celiac disease  Chronic controlled.  Continue with gluten-free diet.    6. Vitamin D deficiency  Chronic controlled.  Continue with over-the-counter supplements.  Will recheck her level.  - VITAMIN D,25 HYDROXY; Future    7. Encounter for screening mammogram for breast cancer  Place a new order for mammogram.  - MA-SCREEN MAMMO W/CAD-BILAT; Future    8. PPD positive  As discussed in HPI, will order a new chest x-ray and will fill her  employer paperwork for her next visit.  She already completed 6 months therapy of isoniazid I.  - DX-CHEST-2 VIEWS; Future      Health Maintenance:      Completed  Health Maintenance Due   Topic   • PAP SMEAR    • MAMMOGRAM        Follow Up:      Return in about 4 weeks (around 7/20/2018) for Well women exam+ pap+ Labs.    Please note that this dictation was created using voice recognition software. I have made every reasonable attempt to correct obvious errors, but I expect that there are errors of grammar and possibly content that I did not discover before finalizing the note.    Signed by: Kelle Tovar M.D.

## 2018-06-23 NOTE — ASSESSMENT & PLAN NOTE
New to me, chronic controlled.  This is mainly exercise-induced asthma since childhood.  She feels winded and has wheeze every time she does strenuous exercise.  She uses as needed albuterol inhaler only on an as-needed basis.

## 2018-06-23 NOTE — ASSESSMENT & PLAN NOTE
New to discuss.  She stated that she tested positive for PPD by her previous employer in 2013, her chest x-ray was negative and she completed 6 months therapy of isoniazid side.  She is required by her current employer to have a chest x-ray every 2 years for clearance.  We will proceed with a new x-ray.  She denied any symptoms of cough, chest pain, shortness of breath, weight loss or loss of appetite.

## 2018-07-24 ENCOUNTER — HOSPITAL ENCOUNTER (OUTPATIENT)
Dept: RADIOLOGY | Facility: MEDICAL CENTER | Age: 48
End: 2018-07-24
Attending: INTERNAL MEDICINE
Payer: COMMERCIAL

## 2018-07-24 ENCOUNTER — HOSPITAL ENCOUNTER (OUTPATIENT)
Dept: RADIOLOGY | Facility: MEDICAL CENTER | Age: 48
End: 2018-07-24

## 2018-07-24 DIAGNOSIS — Z12.31 ENCOUNTER FOR SCREENING MAMMOGRAM FOR BREAST CANCER: ICD-10-CM

## 2018-07-24 PROCEDURE — 77067 SCR MAMMO BI INCL CAD: CPT

## 2018-08-07 ENCOUNTER — OFFICE VISIT (OUTPATIENT)
Dept: MEDICAL GROUP | Facility: LAB | Age: 48
End: 2018-08-07
Payer: COMMERCIAL

## 2018-08-07 VITALS
SYSTOLIC BLOOD PRESSURE: 108 MMHG | DIASTOLIC BLOOD PRESSURE: 66 MMHG | RESPIRATION RATE: 16 BRPM | HEIGHT: 69 IN | HEART RATE: 86 BPM | BODY MASS INDEX: 26.96 KG/M2 | TEMPERATURE: 98.4 F | WEIGHT: 182 LBS | OXYGEN SATURATION: 95 %

## 2018-08-07 DIAGNOSIS — R55 SYNCOPE, UNSPECIFIED SYNCOPE TYPE: ICD-10-CM

## 2018-08-07 DIAGNOSIS — Z00.00 ANNUAL PHYSICAL EXAM: ICD-10-CM

## 2018-08-07 DIAGNOSIS — R76.11 PPD POSITIVE: ICD-10-CM

## 2018-08-07 PROCEDURE — 99214 OFFICE O/P EST MOD 30 MIN: CPT | Performed by: INTERNAL MEDICINE
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
## 2018-08-07 NOTE — ASSESSMENT & PLAN NOTE
This is a chronic controlled problem.   History of her previously positive PPD and she completed 6 months treatment with INH treatment in 2013.  Repeat her chest x-ray in June 2018 which came back completely normal.  She denies any symptoms of chronic cough, fever, night sweats or weight loss.  She brought a form from her employer for TB clearance that we will be filling today.
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
## 2018-08-10 ENCOUNTER — HOSPITAL ENCOUNTER (OUTPATIENT)
Dept: LAB | Facility: MEDICAL CENTER | Age: 48
End: 2018-08-10
Attending: INTERNAL MEDICINE
Payer: COMMERCIAL

## 2018-08-10 DIAGNOSIS — E55.9 VITAMIN D DEFICIENCY: ICD-10-CM

## 2018-08-10 DIAGNOSIS — Z00.00 ANNUAL PHYSICAL EXAM: ICD-10-CM

## 2018-08-10 DIAGNOSIS — R73.03 PREDIABETES: ICD-10-CM

## 2018-08-10 LAB
25(OH)D3 SERPL-MCNC: 35 NG/ML (ref 30–100)
ALBUMIN SERPL BCP-MCNC: 4.7 G/DL (ref 3.2–4.9)
ALBUMIN/GLOB SERPL: 1.6 G/DL
ALP SERPL-CCNC: 76 U/L (ref 30–99)
ALT SERPL-CCNC: 11 U/L (ref 2–50)
ANION GAP SERPL CALC-SCNC: 10 MMOL/L (ref 0–11.9)
AST SERPL-CCNC: 13 U/L (ref 12–45)
BILIRUB SERPL-MCNC: 0.4 MG/DL (ref 0.1–1.5)
BUN SERPL-MCNC: 7 MG/DL (ref 8–22)
CALCIUM SERPL-MCNC: 9.3 MG/DL (ref 8.5–10.5)
CHLORIDE SERPL-SCNC: 108 MMOL/L (ref 96–112)
CHOLEST SERPL-MCNC: 222 MG/DL (ref 100–199)
CO2 SERPL-SCNC: 21 MMOL/L (ref 20–33)
CREAT SERPL-MCNC: 0.75 MG/DL (ref 0.5–1.4)
GLOBULIN SER CALC-MCNC: 2.9 G/DL (ref 1.9–3.5)
GLUCOSE SERPL-MCNC: 85 MG/DL (ref 65–99)
HDLC SERPL-MCNC: 45 MG/DL
LDLC SERPL CALC-MCNC: 162 MG/DL
POTASSIUM SERPL-SCNC: 4.1 MMOL/L (ref 3.6–5.5)
PROT SERPL-MCNC: 7.6 G/DL (ref 6–8.2)
SODIUM SERPL-SCNC: 139 MMOL/L (ref 135–145)
TRIGL SERPL-MCNC: 77 MG/DL (ref 0–149)

## 2018-08-10 PROCEDURE — 83036 HEMOGLOBIN GLYCOSYLATED A1C: CPT

## 2018-08-10 PROCEDURE — 80053 COMPREHEN METABOLIC PANEL: CPT

## 2018-08-10 PROCEDURE — 82306 VITAMIN D 25 HYDROXY: CPT

## 2018-08-10 PROCEDURE — 80061 LIPID PANEL: CPT

## 2018-08-10 PROCEDURE — 36415 COLL VENOUS BLD VENIPUNCTURE: CPT

## 2018-08-11 LAB
EST. AVERAGE GLUCOSE BLD GHB EST-MCNC: 117 MG/DL
HBA1C MFR BLD: 5.7 % (ref 0–5.6)

## 2018-08-22 DIAGNOSIS — R73.03 PREDIABETES: ICD-10-CM

## 2018-09-01 ENCOUNTER — OFFICE VISIT (OUTPATIENT)
Dept: URGENT CARE | Facility: CLINIC | Age: 48
End: 2018-09-01
Payer: COMMERCIAL

## 2018-09-01 VITALS
BODY MASS INDEX: 26.36 KG/M2 | SYSTOLIC BLOOD PRESSURE: 102 MMHG | OXYGEN SATURATION: 98 % | HEART RATE: 100 BPM | RESPIRATION RATE: 16 BRPM | HEIGHT: 69 IN | TEMPERATURE: 98.8 F | DIASTOLIC BLOOD PRESSURE: 70 MMHG | WEIGHT: 178 LBS

## 2018-09-01 DIAGNOSIS — R21 RASH: ICD-10-CM

## 2018-09-01 DIAGNOSIS — R53.81 MALAISE: ICD-10-CM

## 2018-09-01 LAB
AMP AMPHETAMINE: POSITIVE
BAR BARBITURATES: NORMAL
BZO BENZODIAZEPINES: NORMAL
COC COCAINE: NORMAL
INT CON NEG: NEGATIVE
INT CON POS: POSITIVE
MET METHAMPHETAMINES: NORMAL
MTD METHADONE: NORMAL
OPI OPIATES: NORMAL
PCP PHENCYCLIDINE: NORMAL
POC URINE DRUG SCREEN OCDRS: NORMAL
TCA TRICYCLIC ANTIDEPRESSANT: NORMAL
THC: NORMAL

## 2018-09-01 PROCEDURE — 80305 DRUG TEST PRSMV DIR OPT OBS: CPT | Performed by: PHYSICIAN ASSISTANT

## 2018-09-01 PROCEDURE — 99214 OFFICE O/P EST MOD 30 MIN: CPT | Performed by: PHYSICIAN ASSISTANT

## 2018-09-01 RX ORDER — TRIAMCINOLONE ACETONIDE 1 MG/G
CREAM TOPICAL
Qty: 1 TUBE | Refills: 0 | Status: SHIPPED | OUTPATIENT
Start: 2018-09-01 | End: 2018-12-18

## 2018-09-01 ASSESSMENT — ENCOUNTER SYMPTOMS
SHORTNESS OF BREATH: 0
FEVER: 0
COUGH: 0
PALPITATIONS: 0
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
## 2018-09-18 ENCOUNTER — OFFICE VISIT (OUTPATIENT)
Dept: MEDICAL GROUP | Facility: LAB | Age: 48
End: 2018-09-18
Payer: COMMERCIAL

## 2018-09-18 ENCOUNTER — HOSPITAL ENCOUNTER (OUTPATIENT)
Facility: MEDICAL CENTER | Age: 48
End: 2018-09-18
Attending: INTERNAL MEDICINE
Payer: COMMERCIAL

## 2018-09-18 VITALS
BODY MASS INDEX: 25.39 KG/M2 | OXYGEN SATURATION: 97 % | HEART RATE: 90 BPM | HEIGHT: 69 IN | TEMPERATURE: 97.4 F | SYSTOLIC BLOOD PRESSURE: 104 MMHG | RESPIRATION RATE: 18 BRPM | DIASTOLIC BLOOD PRESSURE: 72 MMHG | WEIGHT: 171.4 LBS

## 2018-09-18 DIAGNOSIS — F90.9 ADULT ADHD (ATTENTION DEFICIT HYPERACTIVITY DISORDER): ICD-10-CM

## 2018-09-18 DIAGNOSIS — L98.9 CHANGING SKIN LESION: ICD-10-CM

## 2018-09-18 DIAGNOSIS — R73.03 PREDIABETES: ICD-10-CM

## 2018-09-18 DIAGNOSIS — Z00.00 ANNUAL PHYSICAL EXAM: ICD-10-CM

## 2018-09-18 PROCEDURE — 99386 PREV VISIT NEW AGE 40-64: CPT | Performed by: INTERNAL MEDICINE

## 2018-09-18 PROCEDURE — 88175 CYTOPATH C/V AUTO FLUID REDO: CPT

## 2018-09-18 PROCEDURE — 99000 SPECIMEN HANDLING OFFICE-LAB: CPT | Performed by: INTERNAL MEDICINE

## 2018-09-18 PROCEDURE — 87624 HPV HI-RISK TYP POOLED RSLT: CPT

## 2018-09-18 RX ORDER — LISDEXAMFETAMINE DIMESYLATE CAPSULES 70 MG/1
70 CAPSULE ORAL EVERY MORNING
Qty: 30 CAP | Refills: 0 | Status: SHIPPED | OUTPATIENT
Start: 2018-12-01 | End: 2018-12-18 | Stop reason: SDUPTHER

## 2018-09-18 RX ORDER — LISDEXAMFETAMINE DIMESYLATE CAPSULES 70 MG/1
70 CAPSULE ORAL EVERY MORNING
Qty: 30 CAP | Refills: 0 | Status: SHIPPED | OUTPATIENT
Start: 2018-10-03 | End: 2018-09-18 | Stop reason: SDUPTHER

## 2018-09-18 RX ORDER — LISDEXAMFETAMINE DIMESYLATE CAPSULES 70 MG/1
70 CAPSULE ORAL EVERY MORNING
Qty: 30 CAP | Refills: 0 | Status: SHIPPED | OUTPATIENT
Start: 2018-11-02 | End: 2018-09-18 | Stop reason: SDUPTHER

## 2018-09-18 RX ORDER — METFORMIN HYDROCHLORIDE 500 MG/1
500 TABLET, EXTENDED RELEASE ORAL DAILY
Qty: 90 TAB | Refills: 2 | Status: SHIPPED | OUTPATIENT
Start: 2018-09-18 | End: 2018-12-17

## 2018-09-18 NOTE — PROGRESS NOTES
Subjective:     CC:  Britt Bethea is a 47 y.o.,femalewho presents today for her Pap and GYN exam.    HPI:  She has been menopausal since age: s/p hysterectomy at 40 because of heavy bleeding and family history of uterine metaplasia. Still have ovaries but not sure about her cervix.   She has not utilized HRT.  Currently taking: nothing.     She is , P:3.    She has not had an Abnormal Pap previously.  Her last Mammogram was done:   and it was normal 2018 with implants.  Her preventative health screens are up to date.    She has a changing skin lesion on her right temple area and another one on her right scalp area that has been present for over one year.  Recently it has been itching, frequently bleeds and increasing in size.  She would like to have it checked out.    GYN ROS: Negative for abnormal bleeding, pelvic pain or vaginal discharge, no breast pain or new or enlarging lumps on self exam, no skin or nipple changes ((puckering, thickening, erythema). No dysuria, polyuria or urgency. No fever, chills, generalized fatigue/weakness.    No problem-specific Assessment & Plan notes found for this encounter.      Patient Active Problem List    Diagnosis Date Noted   • Changing skin lesion 2018   • Syncopal episodes 2018   • Mild persistent asthma without complication 2018   • Prediabetes 2018   • Annual physical exam 2018   • Celiac disease 2018   • Vitamin D deficiency 2018   • PPD positive 2018   • Adult ADHD (attention deficit hyperactivity disorder) 2018     Current Outpatient Prescriptions on File Prior to Visit   Medication Sig Dispense Refill   • triamcinolone acetonide (KENALOG) 0.1 % Cream Apply to affected area twice daily for 10 days 1 Tube 0     No current facility-administered medications on file prior to visit.      Social History   Substance Use Topics   • Smoking status: Never Smoker   • Smokeless tobacco: Never Used   • Alcohol  "use No        Physical Exam:     O: /72   Pulse 90   Temp 36.3 °C (97.4 °F)   Resp 18   Ht 1.753 m (5' 9\")   Wt 77.7 kg (171 lb 6.4 oz)   SpO2 97%   Breastfeeding? No   BMI 25.31 kg/m²   Vitals Noted and Reviewed  Breasts: breasts symmetric, no dominant or suspicious mass, no skin or nipple changes and no axillary adenopathy.  Breast implants in place.  Lungs: Clear to auscultation bilaterally   Cardiac: Regular rate and rhythm, normal S1/S2, no mummers, rubs or gallops  Vulva: grossly unremarkable, no lesions or masses noted  Vagina: no abnormal discharge  Cervix: Parous, nonfriable, no surface lesions identified, Pap was performed  Uterus: Surgically Absent  Bimanual exam: No uteromegaly, negative chandelier sign, adnexa freely movable and without enlargements bilaterally  Rectal: not performed  Skin: Right temporal area, 1 x 1 mm skin lesion, covered with makeup, central scab with bleeding.  Another lesion 2 x 1 cm in right scalp area, pearly appearance with central scab and bleeding.       Assessment and Plan:   1. Screening for cervical cancer  2. Encounter for gynecological examination  3. Screening for HPV (human papillomavirus)  Normal Breast and GYN exam.  Pap smear was obtained and sent to an preparation and HPV testing.  Will notify patient about the results.  - THINPREP PAP WITH HPV; Future    4. Changing skin lesion  New, uncontrolled.  Lesion is quite concerning for underlying basal cell carcinoma.  Referred urgently to dermatology for biopsy.  - REFERRAL TO DERMATOLOGY    3. Prediabetes  This is a chronic controlled problem.   Reviewed her A1c at 5.7%.  Would like to switch from metformin twice daily to metformin SR once daily to enhance compliance.  - metFORMIN ER (GLUCOPHAGE XR) 500 MG TABLET SR 24 HR; Take 1 Tab by mouth every day for 90 days.  Dispense: 90 Tab; Refill: 2    4. Adult ADHD (attention deficit hyperactivity disorder)  This is a chronic controlled problem.   She has " problem with her normal check from [facility], she has 2 different accounts those needs to be  together to reflect accurate refill of her medications.  Refilled her prescription for the next 3 months.  Doing okay, no side effects.  - lisdexamfetamine (VYVANSE) 70 MG capsule; Take 1 Cap by mouth every morning for 30 days.  Dispense: 30 Cap; Refill: 0        Followup: Return in about 3 months (around 12/18/2018) for ADHD refill.    Please note that this dictation was created using voice recognition software. I have made every reasonable attempt to correct obvious errors, but I expect that there are errors of grammar and possibly content that I did not discover before finalizing the note.    Signed by: Kelle Tovar M.D.

## 2018-09-19 DIAGNOSIS — Z00.00 ANNUAL PHYSICAL EXAM: ICD-10-CM

## 2018-09-19 LAB
CYTOLOGY REG CYTOL: NORMAL
HPV HR 12 DNA CVX QL NAA+PROBE: NEGATIVE
HPV16 DNA SPEC QL NAA+PROBE: NEGATIVE
HPV18 DNA SPEC QL NAA+PROBE: NEGATIVE
SPECIMEN SOURCE: NORMAL

## 2018-10-01 ENCOUNTER — OFFICE VISIT (OUTPATIENT)
Dept: URGENT CARE | Facility: CLINIC | Age: 48
End: 2018-10-01
Payer: COMMERCIAL

## 2018-10-01 ENCOUNTER — HOSPITAL ENCOUNTER (OUTPATIENT)
Facility: MEDICAL CENTER | Age: 48
End: 2018-10-01
Attending: NURSE PRACTITIONER
Payer: COMMERCIAL

## 2018-10-01 VITALS
BODY MASS INDEX: 25.39 KG/M2 | OXYGEN SATURATION: 99 % | TEMPERATURE: 97.9 F | SYSTOLIC BLOOD PRESSURE: 124 MMHG | WEIGHT: 171.4 LBS | RESPIRATION RATE: 18 BRPM | HEART RATE: 124 BPM | HEIGHT: 69 IN | DIASTOLIC BLOOD PRESSURE: 100 MMHG

## 2018-10-01 DIAGNOSIS — Z20.2 POTENTIAL EXPOSURE TO STD: ICD-10-CM

## 2018-10-01 DIAGNOSIS — R30.0 DYSURIA: ICD-10-CM

## 2018-10-01 DIAGNOSIS — N89.8 VAGINAL DISCHARGE: ICD-10-CM

## 2018-10-01 LAB
APPEARANCE UR: NORMAL
BILIRUB UR STRIP-MCNC: NORMAL MG/DL
COLOR UR AUTO: YELLOW
GLUCOSE UR STRIP.AUTO-MCNC: NORMAL MG/DL
INT CON NEG: NEGATIVE
INT CON POS: POSITIVE
KETONES UR STRIP.AUTO-MCNC: 80 MG/DL
LEUKOCYTE ESTERASE UR QL STRIP.AUTO: NORMAL
NITRITE UR QL STRIP.AUTO: NORMAL
PH UR STRIP.AUTO: 5 [PH] (ref 5–8)
POC URINE PREGNANCY TEST: NEGATIVE
PROT UR QL STRIP: 30 MG/DL
RBC UR QL AUTO: NORMAL
SP GR UR STRIP.AUTO: 1.03
UROBILINOGEN UR STRIP-MCNC: 0.2 MG/DL

## 2018-10-01 PROCEDURE — 87480 CANDIDA DNA DIR PROBE: CPT

## 2018-10-01 PROCEDURE — 87660 TRICHOMONAS VAGIN DIR PROBE: CPT

## 2018-10-01 PROCEDURE — 87491 CHLMYD TRACH DNA AMP PROBE: CPT

## 2018-10-01 PROCEDURE — 99214 OFFICE O/P EST MOD 30 MIN: CPT | Performed by: NURSE PRACTITIONER

## 2018-10-01 PROCEDURE — 81002 URINALYSIS NONAUTO W/O SCOPE: CPT | Performed by: NURSE PRACTITIONER

## 2018-10-01 PROCEDURE — 99000 SPECIMEN HANDLING OFFICE-LAB: CPT | Performed by: NURSE PRACTITIONER

## 2018-10-01 PROCEDURE — 87510 GARDNER VAG DNA DIR PROBE: CPT

## 2018-10-01 PROCEDURE — 81025 URINE PREGNANCY TEST: CPT | Performed by: NURSE PRACTITIONER

## 2018-10-01 PROCEDURE — 87086 URINE CULTURE/COLONY COUNT: CPT

## 2018-10-01 PROCEDURE — 87591 N.GONORRHOEAE DNA AMP PROB: CPT

## 2018-10-01 RX ORDER — SULFAMETHOXAZOLE AND TRIMETHOPRIM 800; 160 MG/1; MG/1
1 TABLET ORAL EVERY 12 HOURS
Qty: 6 TAB | Refills: 0 | Status: SHIPPED | OUTPATIENT
Start: 2018-10-01 | End: 2018-10-04

## 2018-10-01 RX ORDER — AZITHROMYCIN 500 MG/1
1000 TABLET, FILM COATED ORAL ONCE
Qty: 2 TAB | Refills: 0 | Status: SHIPPED | OUTPATIENT
Start: 2018-10-01 | End: 2018-10-01

## 2018-10-01 ASSESSMENT — ENCOUNTER SYMPTOMS
EYE PAIN: 0
SHORTNESS OF BREATH: 0
DIZZINESS: 0
MYALGIAS: 0
VOMITING: 0
SORE THROAT: 0
SWOLLEN GLANDS: 0
ARTHRALGIAS: 0
DIAPHORESIS: 0
FEVER: 0
FLANK PAIN: 0
ABDOMINAL PAIN: 1
NECK PAIN: 0
COUGH: 0
NAUSEA: 0
CHILLS: 0

## 2018-10-02 ENCOUNTER — TELEPHONE (OUTPATIENT)
Dept: URGENT CARE | Facility: PHYSICIAN GROUP | Age: 48
End: 2018-10-02

## 2018-10-02 DIAGNOSIS — B96.89 BV (BACTERIAL VAGINOSIS): ICD-10-CM

## 2018-10-02 DIAGNOSIS — N76.0 BV (BACTERIAL VAGINOSIS): ICD-10-CM

## 2018-10-02 LAB
C TRACH DNA SPEC QL NAA+PROBE: NEGATIVE
CANDIDA DNA VAG QL PROBE+SIG AMP: NEGATIVE
G VAGINALIS DNA VAG QL PROBE+SIG AMP: POSITIVE
N GONORRHOEA DNA SPEC QL NAA+PROBE: NEGATIVE
SPECIMEN SOURCE: NORMAL
T VAGINALIS DNA VAG QL PROBE+SIG AMP: NEGATIVE

## 2018-10-02 RX ORDER — METRONIDAZOLE 500 MG/1
500 TABLET ORAL 2 TIMES DAILY
Qty: 14 TAB | Refills: 0 | Status: SHIPPED | OUTPATIENT
Start: 2018-10-02 | End: 2018-10-09

## 2018-10-02 NOTE — PROGRESS NOTES
Subjective:     Britt Bethea is a 47 y.o. female who presents for Exposure to STD (unprotected sex 4 days ago, has had discharge, achy and fell like there is an odor )  Patient presents today with what she suspects is an STD as she had a protected sex 4 days ago and now has had vaginal discharge with a foul smelling odor, lower abdominal pain and pressure, fatigue. She denies any previous history of STDs. She denies any vaginal bleeding. She does have some discomfort with urination. Denies any flank pain and/or back pain. She denies any vaginal lesions.     Exposure to STD   This is a new problem. Episode onset: 4 days. The problem occurs constantly. The problem has been unchanged. Associated symptoms include abdominal pain (lower abdomen pain) and urinary symptoms. Pertinent negatives include no arthralgias, chest pain, chills, coughing, diaphoresis, fever, myalgias, nausea, neck pain, rash, sore throat, swollen glands or vomiting. Nothing aggravates the symptoms. She has tried nothing for the symptoms. The treatment provided no relief.     Past Medical History:   Diagnosis Date   • Celiac disease    • Celiac disease 6/22/2018    Diagnosed by GI in [place] by official testing and endoscopy. Doing well with gluten free diet.   • History of insulin resistance 2012   • Mild persistent asthma without complication 6/22/2018    Exercise induced asthma. Only with strenuous exercise. PRN Albuterol inhaler.    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    • Vitamin D deficiency 6/22/2018    Taking OTC.      Past Surgical History:   Procedure Laterality Date   • HYSTERECTOMY, VAGINAL  2010    heavy menses   • PB ENLARGE BREAST WITH IMPLANT  2009   • PB REDUCTION OF LARGE BREAST  2009   • APPENDECTOMY  1999     Social History     Social History   • Marital status:      Spouse name: N/A   • Number of children: 3   • Years of  "education: N/A     Occupational History   • [employer]     Social History Main Topics   • Smoking status: Never Smoker   • Smokeless tobacco: Never Used   • Alcohol use No   • Drug use: No   • Sexual activity: Yes     Partners: Male     Other Topics Concern   • Not on file     Social History Narrative   • No narrative on file      Family History   Problem Relation Age of Onset   • Other Mother         DVTs, IVC filter in place   • Diabetes Father    • Other Father         gout   • No Known Problems Sister    • Diabetes Brother    • Cancer Maternal Grandmother         skin cancer   • Breast Cancer Maternal Aunt     Review of Systems   Constitutional: Negative for chills, diaphoresis and fever.   HENT: Negative for sore throat.    Eyes: Negative for pain.   Respiratory: Negative for cough and shortness of breath.    Cardiovascular: Negative for chest pain.   Gastrointestinal: Positive for abdominal pain (lower abdomen pain). Negative for nausea and vomiting.   Genitourinary: Positive for dysuria. Negative for flank pain, frequency, hematuria and urgency.   Musculoskeletal: Negative for arthralgias, myalgias and neck pain.   Skin: Negative for rash.   Neurological: Negative for dizziness.   No Known Allergies   Objective:   /100 (BP Location: Right arm, Patient Position: Sitting, BP Cuff Size: Adult)   Pulse (!) 124   Temp 36.6 °C (97.9 °F)   Resp 18   Ht 1.753 m (5' 9\")   Wt 77.7 kg (171 lb 6.4 oz)   SpO2 99%   BMI 25.31 kg/m²   Physical Exam   Constitutional: She is oriented to person, place, and time. She appears well-developed and well-nourished. No distress.   HENT:   Head: Normocephalic and atraumatic.   Eyes: Pupils are equal, round, and reactive to light. Conjunctivae and EOM are normal.   Cardiovascular: Normal rate and regular rhythm.    No murmur heard.  Pulmonary/Chest: Effort normal and breath sounds normal. No respiratory distress.   Abdominal: Soft. She exhibits no distension. " There is tenderness in the suprapubic area. There is no CVA tenderness.   Genitourinary:   Genitourinary Comments: Deferred exam   Neurological: She is alert and oriented to person, place, and time. She has normal reflexes. No sensory deficit.   Skin: Skin is warm and dry.   Psychiatric: She has a normal mood and affect.         Assessment/Plan:   Assessment    1. Potential exposure to STD  POCT Urinalysis    POCT Pregnancy    VAGINAL PATHOGENS DNA PANEL    azithromycin (ZITHROMAX) 500 MG tablet    cefTRIAXone (ROCEPHIN) 250 mg, lidocaine (XYLOCAINE) 1 % 0.9 mL for IM use    CHLAMYDIA/GC PCR URINE OR SWAB   2. Vaginal discharge  POCT Urinalysis    POCT Pregnancy    VAGINAL PATHOGENS DNA PANEL    azithromycin (ZITHROMAX) 500 MG tablet    cefTRIAXone (ROCEPHIN) 250 mg, lidocaine (XYLOCAINE) 1 % 0.9 mL for IM use    CHLAMYDIA/GC PCR URINE OR SWAB   3. Dysuria  Urine Culture    sulfamethoxazole-trimethoprim (BACTRIM DS) 800-160 MG tablet     UA leuks,blood, ketones present we'll treat for suspected bacterial infection follow-up with pending lab results. We will treat at this time for STD exposure.   Advised to abstain from unprotected sex until finalized results are obtained.  Patient given precautionary s/sx that mandate immediate follow up and evaluation in the ED. Advised of risks of not doing so.    DDX, Supportive care, and indications for immediate follow-up discussed with patient.    Instructed to return to clinic or nearest emergency department if we are not available for any change in condition, further concerns, or worsening of symptoms.    The patient demonstrated a good understanding and agreed with the treatment plan.

## 2018-10-04 LAB
BACTERIA UR CULT: NORMAL
SIGNIFICANT IND 70042: NORMAL
SITE SITE: NORMAL
SOURCE SOURCE: NORMAL

## 2018-10-17 ENCOUNTER — APPOINTMENT (RX ONLY)
Dept: URBAN - METROPOLITAN AREA CLINIC 20 | Facility: CLINIC | Age: 48
Setting detail: DERMATOLOGY
End: 2018-10-17

## 2018-10-17 DIAGNOSIS — D18.0 HEMANGIOMA: ICD-10-CM

## 2018-10-17 DIAGNOSIS — L82.1 OTHER SEBORRHEIC KERATOSIS: ICD-10-CM

## 2018-10-17 DIAGNOSIS — D22 MELANOCYTIC NEVI: ICD-10-CM

## 2018-10-17 DIAGNOSIS — L81.4 OTHER MELANIN HYPERPIGMENTATION: ICD-10-CM

## 2018-10-17 DIAGNOSIS — Z71.89 OTHER SPECIFIED COUNSELING: ICD-10-CM

## 2018-10-17 DIAGNOSIS — L82.0 INFLAMED SEBORRHEIC KERATOSIS: ICD-10-CM

## 2018-10-17 PROBLEM — D22.39 MELANOCYTIC NEVI OF OTHER PARTS OF FACE: Status: ACTIVE | Noted: 2018-10-17

## 2018-10-17 PROBLEM — D22.62 MELANOCYTIC NEVI OF LEFT UPPER LIMB, INCLUDING SHOULDER: Status: ACTIVE | Noted: 2018-10-17

## 2018-10-17 PROBLEM — E13.9 OTHER SPECIFIED DIABETES MELLITUS WITHOUT COMPLICATIONS: Status: ACTIVE | Noted: 2018-10-17

## 2018-10-17 PROBLEM — D48.5 NEOPLASM OF UNCERTAIN BEHAVIOR OF SKIN: Status: ACTIVE | Noted: 2018-10-17

## 2018-10-17 PROBLEM — D22.61 MELANOCYTIC NEVI OF RIGHT UPPER LIMB, INCLUDING SHOULDER: Status: ACTIVE | Noted: 2018-10-17

## 2018-10-17 PROBLEM — D22.5 MELANOCYTIC NEVI OF TRUNK: Status: ACTIVE | Noted: 2018-10-17

## 2018-10-17 PROBLEM — D18.01 HEMANGIOMA OF SKIN AND SUBCUTANEOUS TISSUE: Status: ACTIVE | Noted: 2018-10-17

## 2018-10-17 PROCEDURE — ? BIOPSY BY SHAVE METHOD

## 2018-10-17 PROCEDURE — ? SUNSCREEN RECOMMENDATIONS

## 2018-10-17 PROCEDURE — 17110 DESTRUCTION B9 LES UP TO 14: CPT

## 2018-10-17 PROCEDURE — ? COUNSELING

## 2018-10-17 PROCEDURE — 11100: CPT | Mod: 59

## 2018-10-17 PROCEDURE — ? OBSERVATION AND MEASURE

## 2018-10-17 PROCEDURE — ? LIQUID NITROGEN

## 2018-10-17 PROCEDURE — 99203 OFFICE O/P NEW LOW 30 MIN: CPT | Mod: 25

## 2018-10-17 ASSESSMENT — LOCATION DETAILED DESCRIPTION DERM
LOCATION DETAILED: LEFT PROXIMAL DORSAL FOREARM
LOCATION DETAILED: INFERIOR THORACIC SPINE
LOCATION DETAILED: RIGHT MEDIAL BREAST 1-2:00 REGION
LOCATION DETAILED: RIGHT VENTRAL DISTAL FOREARM
LOCATION DETAILED: RIGHT INFERIOR UPPER BACK
LOCATION DETAILED: SUPERIOR THORACIC SPINE
LOCATION DETAILED: RIGHT RADIAL DORSAL HAND
LOCATION DETAILED: POSTERIOR MID-PARIETAL SCALP
LOCATION DETAILED: LEFT RADIAL DORSAL HAND
LOCATION DETAILED: RIGHT INFERIOR MEDIAL FOREHEAD
LOCATION DETAILED: RIGHT MEDIAL UPPER BACK
LOCATION DETAILED: RIGHT PROXIMAL DORSAL FOREARM
LOCATION DETAILED: LEFT VENTRAL PROXIMAL FOREARM
LOCATION DETAILED: RIGHT LATERAL FOREHEAD

## 2018-10-17 ASSESSMENT — LOCATION SIMPLE DESCRIPTION DERM
LOCATION SIMPLE: LEFT HAND
LOCATION SIMPLE: LEFT FOREARM
LOCATION SIMPLE: POSTERIOR SCALP
LOCATION SIMPLE: RIGHT HAND
LOCATION SIMPLE: UPPER BACK
LOCATION SIMPLE: RIGHT FOREARM
LOCATION SIMPLE: RIGHT BREAST
LOCATION SIMPLE: RIGHT UPPER BACK
LOCATION SIMPLE: RIGHT FOREHEAD

## 2018-10-17 ASSESSMENT — LOCATION ZONE DERM
LOCATION ZONE: ARM
LOCATION ZONE: FACE
LOCATION ZONE: HAND
LOCATION ZONE: SCALP
LOCATION ZONE: TRUNK

## 2018-10-17 NOTE — PROCEDURE: BIOPSY BY SHAVE METHOD
Destruction After The Procedure: No
Post-Care Instructions: I reviewed with the patient in detail post-care instructions. Patient is to keep the biopsy site clean once daily and then apply petroleum and bandaid  until healed.
Render Post-Care Instructions In Note?: yes
Consent: Written consent was obtained and risks were reviewed including but not limited to scarring, infection, bleeding, scabbing, incomplete removal, nerve damage and allergy to anesthesia.
Lab: [id number]
Biopsy Type: H and E
Lab Facility: 
Notification Instructions: Patient will be notified of biopsy results. However, patient instructed to call the office if not contacted within 2 weeks.
Type Of Destruction Used: Curettage
Biopsy Method: Personna blade
Billing Type: Third-Party Bill
Depth Of Biopsy: dermis
Anesthesia Type: 1% lidocaine with 1:100,000 epinephrine and a 1:12 solution of 8.4% sodium bicarbonate
Additional Anesthesia Volume In Cc (Will Not Render If 0): 0
Wound Care: Bacitracin
X Size Of Lesion In Cm: 0.4
Anesthesia Volume In Cc: 1
Detail Level: Detailed
Size Of Lesion In Cm: 0.5
Dressing: Band-Aid
Hemostasis: Drysol and Electrocautery

## 2018-10-25 ENCOUNTER — OFFICE VISIT (OUTPATIENT)
Dept: URGENT CARE | Facility: CLINIC | Age: 48
End: 2018-10-25
Payer: COMMERCIAL

## 2018-10-25 VITALS
SYSTOLIC BLOOD PRESSURE: 122 MMHG | HEIGHT: 69 IN | TEMPERATURE: 97.7 F | HEART RATE: 78 BPM | DIASTOLIC BLOOD PRESSURE: 82 MMHG | OXYGEN SATURATION: 99 %

## 2018-10-25 DIAGNOSIS — J02.9 PHARYNGITIS, UNSPECIFIED ETIOLOGY: ICD-10-CM

## 2018-10-25 LAB
INT CON NEG: NEGATIVE
INT CON POS: POSITIVE
S PYO AG THROAT QL: NEGATIVE

## 2018-10-25 PROCEDURE — 87880 STREP A ASSAY W/OPTIC: CPT | Performed by: PHYSICIAN ASSISTANT

## 2018-10-25 PROCEDURE — 99214 OFFICE O/P EST MOD 30 MIN: CPT | Performed by: PHYSICIAN ASSISTANT

## 2018-10-25 RX ORDER — DIPHENHYDRAMINE HYDROCHLORIDE AND LIDOCAINE HYDROCHLORIDE AND ALUMINUM HYDROXIDE AND MAGNESIUM HYDRO
5 KIT EVERY 6 HOURS PRN
Qty: 10 ML | Refills: 0 | Status: SHIPPED | OUTPATIENT
Start: 2018-10-25 | End: 2018-12-18

## 2018-10-25 RX ORDER — AMOXICILLIN 500 MG/1
500 CAPSULE ORAL 2 TIMES DAILY
Qty: 14 CAP | Refills: 0 | Status: SHIPPED | OUTPATIENT
Start: 2018-10-25 | End: 2018-11-01

## 2018-10-25 ASSESSMENT — ENCOUNTER SYMPTOMS
HEADACHES: 1
NAUSEA: 0
CHILLS: 0
SORE THROAT: 1
SHORTNESS OF BREATH: 0
DIZZINESS: 0
FEVER: 0
MUSCULOSKELETAL NEGATIVE: 1
ABDOMINAL PAIN: 0
VOMITING: 0
COUGH: 0
SPUTUM PRODUCTION: 0
DIARRHEA: 0
TROUBLE SWALLOWING: 0

## 2018-10-25 NOTE — PROGRESS NOTES
"Subjective:      Britt Bethea is a 48 y.o. female who presents with Pharyngitis (x1 day) and Headache (x1 day)            Pharyngitis    This is a new problem. The current episode started today. The problem has been unchanged. Neither side of throat is experiencing more pain than the other. There has been no fever. The pain is at a severity of 5/10. The pain is moderate. Associated symptoms include headaches. Pertinent negatives include no abdominal pain, congestion, coughing, diarrhea, ear pain, shortness of breath, trouble swallowing or vomiting. She has had no exposure to strep or mono. She has tried nothing for the symptoms.     Patient denies fevers, chills, body aches, cough, congestion, chest pain, or SOB.     Review of Systems   Constitutional: Negative for chills and fever.   HENT: Positive for sore throat. Negative for congestion, ear pain and trouble swallowing.    Respiratory: Negative for cough, sputum production and shortness of breath.    Cardiovascular: Negative for chest pain.   Gastrointestinal: Negative for abdominal pain, diarrhea, nausea and vomiting.   Genitourinary: Negative.    Musculoskeletal: Negative.    Skin: Negative for rash.   Neurological: Positive for headaches. Negative for dizziness.        Objective:     /82 (BP Location: Right arm, Patient Position: Sitting)   Pulse 78   Temp 36.5 °C (97.7 °F) (Temporal)   Ht 1.753 m (5' 9.02\")   SpO2 99%      Physical Exam   Constitutional: She is oriented to person, place, and time. She appears well-developed and well-nourished. No distress.   HENT:   Head: Normocephalic and atraumatic.   Right Ear: Hearing, tympanic membrane, external ear and ear canal normal.   Left Ear: Hearing, tympanic membrane, external ear and ear canal normal.   Mouth/Throat: Posterior oropharyngeal erythema present. No posterior oropharyngeal edema.   Eyes: Pupils are equal, round, and reactive to light. Conjunctivae are normal. Right eye exhibits no " discharge. Left eye exhibits no discharge.   Neck: Normal range of motion.   Cardiovascular: Normal rate.    Pulmonary/Chest: Effort normal.   Musculoskeletal: Normal range of motion.   Lymphadenopathy:     She has cervical adenopathy.   Neurological: She is alert and oriented to person, place, and time.   Skin: Skin is warm and dry. She is not diaphoretic.   Psychiatric: She has a normal mood and affect. Her behavior is normal.   Nursing note and vitals reviewed.       PMH:  has a past medical history of Celiac disease; Celiac disease (6/22/2018); History of insulin resistance (2012); Mild persistent asthma without complication (6/22/2018); PPD positive (6/22/2018); and Vitamin D deficiency (6/22/2018).  MEDS:   Current Outpatient Prescriptions:   •  amoxicillin (AMOXIL) 500 MG Cap, Take 1 Cap by mouth 2 times a day for 7 days., Disp: 14 Cap, Rfl: 0  •  DPH-Lido-AlHydr-MgHydr-Simeth (MAGIC MOUTHWASH BLM) Suspension, Take 5 mL by mouth every 6 hours as needed., Disp: 10 mL, Rfl: 0  •  metFORMIN ER (GLUCOPHAGE XR) 500 MG TABLET SR 24 HR, Take 1 Tab by mouth every day for 90 days., Disp: 90 Tab, Rfl: 2  •  [START ON 12/1/2018] lisdexamfetamine (VYVANSE) 70 MG capsule, Take 1 Cap by mouth every morning for 30 days., Disp: 30 Cap, Rfl: 0  •  triamcinolone acetonide (KENALOG) 0.1 % Cream, Apply to affected area twice daily for 10 days (Patient not taking: Reported on 10/25/2018), Disp: 1 Tube, Rfl: 0  ALLERGIES: No Known Allergies  SURGHX:   Past Surgical History:   Procedure Laterality Date   • HYSTERECTOMY, VAGINAL  2010    heavy menses   • PB ENLARGE BREAST WITH IMPLANT  2009   • PB REDUCTION OF LARGE BREAST  2009   • APPENDECTOMY  1999     SOCHX:  reports that she has never smoked. She has never used smokeless tobacco. She reports that she does not drink alcohol or use drugs.  FH: family history includes Breast Cancer in her maternal aunt; Cancer in her maternal grandmother; Diabetes in her brother and father; No  Known Problems in her sister; Other in her father and mother.       Assessment/Plan:     1. Pharyngitis, unspecified etiology  - POCT Rapid Strep A: NEGATIVE  - amoxicillin (AMOXIL) 500 MG Cap; Take 1 Cap by mouth 2 times a day for 7 days.  Dispense: 14 Cap; Refill: 0  - DPH-Lido-AlHydr-MgHydr-Simeth (MAGIC MOUTHWASH BLM) Suspension; Take 5 mL by mouth every 6 hours as needed.  Dispense: 10 mL; Refill: 0    Advised patient symptoms are most likely viral in etiology, recommend supportive care. Increased fluids and rest. Warm salt water gargles and magic mouthwash as needed for symptomatic relief. Patient is leaving on vacation to [place] in 2 days, contingent abx given to take with her and to initiate if symptoms persist/worsen. The patient demonstrated a good understanding and agreed with the treatment plan.

## 2018-12-04 ENCOUNTER — TELEPHONE (OUTPATIENT)
Dept: MEDICAL GROUP | Facility: LAB | Age: 48
End: 2018-12-04

## 2018-12-04 DIAGNOSIS — N30.00 ACUTE CYSTITIS WITHOUT HEMATURIA: ICD-10-CM

## 2018-12-04 RX ORDER — NITROFURANTOIN 25; 75 MG/1; MG/1
100 CAPSULE ORAL 2 TIMES DAILY
Qty: 10 CAP | Refills: 0 | Status: SHIPPED | OUTPATIENT
Start: 2018-12-04 | End: 2018-12-09

## 2018-12-05 NOTE — TELEPHONE ENCOUNTER
VOICEMAIL  1. Caller Name: Britt                      Call Back Number: [phone]    2. Message: Britt called, she is experiencing uti symptoms that are getting worse, she wasn't able to sleep last night, lots of pain, burning and frequent urination. She would love it if you could call something in to her [facility].    3. Patient approves office to leave a detailed voicemail/MyChart message: yes

## 2018-12-06 NOTE — TELEPHONE ENCOUNTER
Phone Number Called: [phone] (home)     Message: I spoke Britt, she did  the Rx from the pharmacy and she is starting to feel better.    Left Message for patient to call back: N\A

## 2018-12-18 ENCOUNTER — OFFICE VISIT (OUTPATIENT)
Dept: MEDICAL GROUP | Facility: LAB | Age: 48
End: 2018-12-18
Payer: COMMERCIAL

## 2018-12-18 VITALS
HEIGHT: 69 IN | HEART RATE: 106 BPM | TEMPERATURE: 97.5 F | DIASTOLIC BLOOD PRESSURE: 70 MMHG | RESPIRATION RATE: 18 BRPM | SYSTOLIC BLOOD PRESSURE: 116 MMHG | WEIGHT: 148.04 LBS | BODY MASS INDEX: 21.93 KG/M2 | OXYGEN SATURATION: 98 %

## 2018-12-18 DIAGNOSIS — E78.49 OTHER HYPERLIPIDEMIA: ICD-10-CM

## 2018-12-18 DIAGNOSIS — R73.03 PREDIABETES: ICD-10-CM

## 2018-12-18 DIAGNOSIS — F90.9 ADULT ADHD (ATTENTION DEFICIT HYPERACTIVITY DISORDER): ICD-10-CM

## 2018-12-18 PROCEDURE — 99214 OFFICE O/P EST MOD 30 MIN: CPT | Performed by: INTERNAL MEDICINE

## 2018-12-18 RX ORDER — METFORMIN HYDROCHLORIDE 500 MG/1
500 TABLET, EXTENDED RELEASE ORAL DAILY
Qty: 90 TAB | Refills: 2 | Status: SHIPPED | OUTPATIENT
Start: 2018-12-18 | End: 2019-03-18

## 2018-12-18 RX ORDER — LISDEXAMFETAMINE DIMESYLATE CAPSULES 70 MG/1
70 CAPSULE ORAL EVERY MORNING
Qty: 30 CAP | Refills: 0 | Status: SHIPPED | OUTPATIENT
Start: 2019-01-03 | End: 2019-02-02

## 2018-12-18 RX ORDER — LISDEXAMFETAMINE DIMESYLATE CAPSULES 70 MG/1
70 CAPSULE ORAL EVERY MORNING
Qty: 30 CAP | Refills: 0 | Status: SHIPPED | OUTPATIENT
Start: 2019-02-01 | End: 2019-03-03

## 2018-12-18 RX ORDER — LISDEXAMFETAMINE DIMESYLATE CAPSULES 70 MG/1
70 CAPSULE ORAL EVERY MORNING
Qty: 30 CAP | Refills: 0 | Status: SHIPPED | OUTPATIENT
Start: 2019-03-01 | End: 2019-03-01

## 2018-12-18 NOTE — ASSESSMENT & PLAN NOTE
This is a chronic controlled problem.   We discussed that patient is diagnosed with prediabetes given that the A1c is:   Lab Results   Component Value Date/Time    HBA1C 5.7 (H) 08/10/2018 10:03 AM   Resume taking metformin 500 mg extended release in mid 2018.  She has been on this dose from her previous provider.  She has been tolerating it well.    We discussed that prediabetes/diabetes is a medical condition of lifestyle habits (less than optimal dietary choices, insufficient cardiovascular exercise). Furthermore, we discussed that at present time it is better to pursue lifestyle changes rather starting medications. Patient is  in agreement. Please see review of systems as below.    ROS is NEGATIVE for blurred vision, polydipsia, polyuria, diaphoresis, palpitations, fatigue, irritability, flank pain, BLE paresthesias.

## 2018-12-18 NOTE — ASSESSMENT & PLAN NOTE
This is a chronic controlled problem.   This was diagnosed in 2010 and she has been on Vyvanse 70 mg in the morning from her previous provider.  To resume this dosing and beginning of 2018 with her new job.  She believes she is doing well on this current dose.  Denies, chest pain, palpitations, jitteriness, insomnia.  She believes her concentration has remarkably improved on this medication.  She is not drinking alcohol and not using illicit drugs.  She had reported multiple psychosocial stressors recently has been going through divorce moving.

## 2018-12-18 NOTE — PROGRESS NOTES
Chief Complaint   Patient presents with   • Medication Refill     adderall       Subjective:     HPI:   Britt presents today with the following.    Adult ADHD (attention deficit hyperactivity disorder)  This is a chronic controlled problem.   This was diagnosed in 2010 and she has been on Vyvanse 70 mg in the morning from her previous provider.  To resume this dosing and beginning of 2018 with her new job.  She believes she is doing well on this current dose.  Denies, chest pain, palpitations, jitteriness, insomnia.  She believes her concentration has remarkably improved on this medication.  She is not drinking alcohol and not using illicit drugs.  She had reported multiple psychosocial stressors recently has been going through divorce moving.        Prediabetes  This is a chronic controlled problem.   We discussed that patient is diagnosed with prediabetes given that the A1c is:   Lab Results   Component Value Date/Time    HBA1C 5.7 (H) 08/10/2018 10:03 AM   Resume taking metformin 500 mg extended release in mid 2018.  She has been on this dose from her previous provider.  She has been tolerating it well.    We discussed that prediabetes/diabetes is a medical condition of lifestyle habits (less than optimal dietary choices, insufficient cardiovascular exercise). Furthermore, we discussed that at present time it is better to pursue lifestyle changes rather starting medications. Patient is  in agreement. Please see review of systems as below.    ROS is NEGATIVE for blurred vision, polydipsia, polyuria, diaphoresis, palpitations, fatigue, irritability, flank pain, BLE paresthesias.    Other hyperlipidemia  New to discuss, uncontrolled.   Lab Results   Component Value Date/Time    CHOLSTRLTOT 222 (H) 08/10/2018 10:03 AM    CHOLSTRLTOT 169 09/01/2017 07:22 AM     (H) 08/10/2018 10:03 AM     (H) 09/01/2017 07:22 AM    HDL 45 08/10/2018 10:03 AM    HDL 40 09/01/2017 07:22 AM    TRIGLYCERIDE 77 08/10/2018  10:03 AM    TRIGLYCERIDE 92 09/01/2017 07:22 AM     She has not been doing well with her lifestyle and general because of her recent psychosocial stressors.  She has been going through divorce and moving recently.  She is not eating healthy choices and she has not been exercising on a regular basis.        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 30 Cap 0   • [START ON 3/1/2019] lisdexamfetamine (VYVANSE) 70 MG capsule Take 1 Cap by mouth every morning for 30 days. 30 Cap 0   • [START ON 2/1/2019] lisdexamfetamine (VYVANSE) 70 MG capsule Take 1 Cap by mouth every morning for 30 days. 30 Cap 0   • metFORMIN ER (GLUCOPHAGE XR) 500 MG TABLET SR 24 HR Take 1 Tab by mouth every day for 90 days. 90 Tab 2     No current facility-administered medications for this visit.        Allergies as of 12/18/2018   • (No Known Allergies)        Past Medical History:   Diagnosis Date   • Celiac disease    • Celiac disease 6/22/2018    Diagnosed by GI in [place] by official testing and endoscopy. Doing well with gluten free diet.   • History of insulin resistance 2012   • Mild persistent asthma without complication 6/22/2018    Exercise induced asthma. Only with strenuous exercise. PRN Albuterol inhaler.    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 Needs new  "CXR for her employer.    • Vitamin D deficiency 6/22/2018    Taking OTC.        Past Surgical History:   Procedure Laterality Date   • HYSTERECTOMY, VAGINAL  2010    heavy menses   • PB ENLARGE BREAST WITH IMPLANT  2009   • PB REDUCTION OF LARGE BREAST  2009   • APPENDECTOMY  1999       Social History   Substance Use Topics   • Smoking status: Never Smoker   • Smokeless tobacco: Never Used   • Alcohol use No       Family History   Problem Relation Age of Onset   • Other Mother         DVTs, IVC filter in place   • Diabetes Father    • Other Father         gout   • No Known Problems Sister    • Diabetes Brother    • Cancer Maternal Grandmother         skin cancer   • Breast Cancer Maternal Aunt        ROS:     - Constitutional: Negative for fever, chills, unexpected weight change, and fatigue/generalized weakness.     - HEENT: Negative for headaches, vision changes, hearing changes, ear pain, ear discharge, sinus congestion, or sore throat.     - Respiratory: Negative for cough, sputum production, dyspnea and wheezing.    - Cardiovascular: Negative for chest pain or palpitations.      - Gastrointestinal: Negative for heartburn, nausea, vomiting, abdominal pain, diarrhea or constipation.     - Genitourinary: Negative for dysuria, polyuria or urinary urgency.    - Musculoskeletal: Negative for myalgias, back pain, and joint pain.     - Skin: Negative for rash, itching, cyanotic skin color change.     - Psychiatric/Behavioral: Negative for depression or suicidal/homicidal ideation.       Physical Exam:     Blood pressure 116/70, pulse (!) 106, temperature 36.4 °C (97.5 °F), temperature source Temporal, resp. rate 18, height 1.753 m (5' 9\"), weight 67.2 kg (148 lb 0.6 oz), SpO2 98 %, not currently breastfeeding. Body mass index is 21.86 kg/m².   Gen:         Alert and oriented, No apparent distress.  Neck:        No Lymphadenopathy or Bruits.  Lungs:     Clear to auscultation bilaterally  CV:          Regular rate and " rhythm. No murmurs, rubs or gallops.       Ext:          No clubbing, cyanosis, edema.    Data:     LABS: 8/2018: Results reviewed and discussed with the patient, questions answered.      Assessment and Plan:     48 y.o. female with the following issues.    1. Adult ADHD (attention deficit hyperactivity disorder)  This is a chronic controlled problem.   Comes are well controlled on Vyvanse 70 mg daily in the morning.  Has been reviewed her chart check and she has been filling it consistently.  We will obtain a new [employer] screen to be on file.  Will be given 3-month supply and follow-up in 3 months.    - lisdexamfetamine (VYVANSE) 70 MG capsule; Take 1 Cap by mouth every morning for 30 days.  Dispense: 30 Cap; Refill: 0  - lisdexamfetamine (VYVANSE) 70 MG capsule; Take 1 Cap by mouth every morning for 30 days.  Dispense: 30 Cap; Refill: 0  - lisdexamfetamine (VYVANSE) 70 MG capsule; Take 1 Cap by mouth every morning for 30 days.  Dispense: 30 Cap; Refill: 0    2. Prediabetes  This is a chronic controlled problem.   Doing well on metformin extended release 500 mg once daily.  I sent a new prescription to her pharmacy.  Last A1c 5.7%.  Discussed lifestyle modifications and will recheck A1c in 6 months.    - HEMOGLOBIN A1C; Future    3. Other hyperlipidemia  This is a chronic uncontrolled problem.   Discussed lifestyle modifications and will repeat lipid panel in 6 months.    - Lipid Profile; Future        Follow Up:      No Follow-up on file.      Please note that this dictation was created using voice recognition software. I have made every reasonable attempt to correct obvious errors, but I expect that there are errors of grammar and possibly content that I did not discover before finalizing the note.    Signed by: Kelle Tovar M.D.

## 2019-02-28 ENCOUNTER — TELEPHONE (OUTPATIENT)
Dept: MEDICAL GROUP | Facility: LAB | Age: 49
End: 2019-02-28

## 2019-02-28 DIAGNOSIS — N30.00 ACUTE CYSTITIS WITHOUT HEMATURIA: ICD-10-CM

## 2019-02-28 RX ORDER — NITROFURANTOIN 25; 75 MG/1; MG/1
100 CAPSULE ORAL 2 TIMES DAILY
Qty: 10 CAP | Refills: 0 | Status: SHIPPED | OUTPATIENT
Start: 2019-02-28 | End: 2019-03-01

## 2019-02-28 NOTE — TELEPHONE ENCOUNTER
VOICEMAIL Thursday 2/28/19 @ 1:07am  1. Caller Name: Britt                      Call Back Number: [phone] (home)     2. Message: Britt woke up this morning with a UTI, she has been seen by you once before, she would like something to be called in for at the [facility]. Also her last name has changed from Lake to Moris.    3. Patient approves office to leave a detailed voicemail/MyChart message: yes

## 2019-03-01 ENCOUNTER — HOSPITAL ENCOUNTER (OUTPATIENT)
Facility: MEDICAL CENTER | Age: 49
End: 2019-03-01
Attending: INTERNAL MEDICINE
Payer: COMMERCIAL

## 2019-03-01 ENCOUNTER — OFFICE VISIT (OUTPATIENT)
Dept: MEDICAL GROUP | Facility: LAB | Age: 49
End: 2019-03-01
Payer: COMMERCIAL

## 2019-03-01 VITALS
HEART RATE: 112 BPM | BODY MASS INDEX: 21.15 KG/M2 | WEIGHT: 142.8 LBS | SYSTOLIC BLOOD PRESSURE: 108 MMHG | OXYGEN SATURATION: 100 % | RESPIRATION RATE: 16 BRPM | TEMPERATURE: 100.7 F | HEIGHT: 69 IN | DIASTOLIC BLOOD PRESSURE: 82 MMHG

## 2019-03-01 DIAGNOSIS — R68.89 FLU-LIKE SYMPTOMS: Primary | ICD-10-CM

## 2019-03-01 DIAGNOSIS — N30.00 ACUTE CYSTITIS WITHOUT HEMATURIA: ICD-10-CM

## 2019-03-01 DIAGNOSIS — H66.90 ACUTE OTITIS MEDIA, UNSPECIFIED OTITIS MEDIA TYPE: ICD-10-CM

## 2019-03-01 LAB
APPEARANCE UR: ABNORMAL
BACTERIA #/AREA URNS HPF: ABNORMAL /HPF
BILIRUB UR QL STRIP.AUTO: ABNORMAL
COLOR UR: ABNORMAL
EPI CELLS #/AREA URNS HPF: ABNORMAL /HPF
FLUAV+FLUBV AG SPEC QL IA: NORMAL
GLUCOSE UR STRIP.AUTO-MCNC: NEGATIVE MG/DL
INT CON NEG: NEGATIVE
INT CON POS: POSITIVE
KETONES UR STRIP.AUTO-MCNC: 15 MG/DL
LEUKOCYTE ESTERASE UR QL STRIP.AUTO: ABNORMAL
MICRO URNS: ABNORMAL
NITRITE UR QL STRIP.AUTO: NEGATIVE
PH UR STRIP.AUTO: 7 [PH]
PROT UR QL STRIP: 30 MG/DL
RBC # URNS HPF: ABNORMAL /HPF
RBC UR QL AUTO: NEGATIVE
SP GR UR STRIP.AUTO: 1.03
UROBILINOGEN UR STRIP.AUTO-MCNC: 0.2 MG/DL
WBC #/AREA URNS HPF: ABNORMAL /HPF

## 2019-03-01 PROCEDURE — 81001 URINALYSIS AUTO W/SCOPE: CPT

## 2019-03-01 PROCEDURE — 99214 OFFICE O/P EST MOD 30 MIN: CPT | Performed by: INTERNAL MEDICINE

## 2019-03-01 PROCEDURE — 87804 INFLUENZA ASSAY W/OPTIC: CPT | Performed by: INTERNAL MEDICINE

## 2019-03-01 RX ORDER — IBUPROFEN 600 MG/1
600 TABLET ORAL EVERY 6 HOURS PRN
Qty: 30 TAB | Refills: 0
Start: 2019-03-01 | End: 2019-03-09

## 2019-03-01 RX ORDER — AMOXICILLIN AND CLAVULANATE POTASSIUM 875; 125 MG/1; MG/1
1 TABLET, FILM COATED ORAL 2 TIMES DAILY
Qty: 14 TAB | Refills: 0 | Status: SHIPPED | OUTPATIENT
Start: 2019-03-01 | End: 2019-03-08

## 2019-03-01 ASSESSMENT — PATIENT HEALTH QUESTIONNAIRE - PHQ9: CLINICAL INTERPRETATION OF PHQ2 SCORE: 0
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
## 2019-03-01 NOTE — ASSESSMENT & PLAN NOTE
Associated with her symptoms discussed above she has bilateral ear pain, sharp deep, no associated drainage or loss of hearing.

## 2019-03-01 NOTE — TELEPHONE ENCOUNTER
Phone Number Called: [phone] (home)     Message: I spoke to Britt, she already started taking the antibiotics today. Do you still want her to do a UA at the lab tomorrow?    Left Message for patient to call back: yes

## 2019-03-01 NOTE — TELEPHONE ENCOUNTER
Phone Number Called: [phone] (home)     Message: Britt went to the lab this morning, she wasn't feeling well, I made her an appointment with Dr. Tovar today.    Left Message for patient to call back:n/a

## 2019-03-09 ENCOUNTER — APPOINTMENT (OUTPATIENT)
Dept: RADIOLOGY | Facility: MEDICAL CENTER | Age: 49
DRG: 871 | End: 2019-03-09
Attending: EMERGENCY MEDICINE
Payer: COMMERCIAL

## 2019-03-09 ENCOUNTER — HOSPITAL ENCOUNTER (INPATIENT)
Facility: MEDICAL CENTER | Age: 49
LOS: 2 days | DRG: 871 | End: 2019-03-11
Attending: EMERGENCY MEDICINE | Admitting: FAMILY MEDICINE
Payer: COMMERCIAL

## 2019-03-09 ENCOUNTER — APPOINTMENT (OUTPATIENT)
Dept: RADIOLOGY | Facility: MEDICAL CENTER | Age: 49
DRG: 871 | End: 2019-03-09
Attending: INTERNAL MEDICINE
Payer: COMMERCIAL

## 2019-03-09 DIAGNOSIS — J10.1 INFLUENZA A: ICD-10-CM

## 2019-03-09 DIAGNOSIS — A41.9 SEPSIS, DUE TO UNSPECIFIED ORGANISM: ICD-10-CM

## 2019-03-09 DIAGNOSIS — J01.90 ACUTE SINUSITIS, RECURRENCE NOT SPECIFIED, UNSPECIFIED LOCATION: ICD-10-CM

## 2019-03-09 PROBLEM — E87.1 HYPONATREMIA: Status: ACTIVE | Noted: 2019-03-09

## 2019-03-09 PROBLEM — R65.21 SEPTIC SHOCK (HCC): Status: ACTIVE | Noted: 2019-03-09

## 2019-03-09 PROBLEM — J01.00 ACUTE MAXILLARY SINUSITIS: Status: ACTIVE | Noted: 2019-03-09

## 2019-03-09 PROBLEM — E87.6 HYPOKALEMIA: Status: ACTIVE | Noted: 2019-03-09

## 2019-03-09 PROBLEM — E11.9 TYPE 2 DIABETES MELLITUS (HCC): Status: ACTIVE | Noted: 2019-03-09

## 2019-03-09 PROBLEM — Z86.59 HISTORY OF ATTENTION DEFICIT DISORDER: Status: ACTIVE | Noted: 2019-03-09

## 2019-03-09 PROBLEM — J01.80 OTHER ACUTE SINUSITIS: Status: ACTIVE | Noted: 2019-03-09

## 2019-03-09 PROBLEM — D75.1 POLYCYTHEMIA: Status: ACTIVE | Noted: 2019-03-09

## 2019-03-09 LAB
ALBUMIN SERPL BCP-MCNC: 4.5 G/DL (ref 3.2–4.9)
ALBUMIN/GLOB SERPL: 1.2 G/DL
ALP SERPL-CCNC: 71 U/L (ref 30–99)
ALT SERPL-CCNC: 25 U/L (ref 2–50)
ANION GAP SERPL CALC-SCNC: 12 MMOL/L (ref 0–11.9)
APPEARANCE UR: CLEAR
AST SERPL-CCNC: 21 U/L (ref 12–45)
BASOPHILS # BLD AUTO: 0.5 % (ref 0–1.8)
BASOPHILS # BLD: 0.11 K/UL (ref 0–0.12)
BILIRUB SERPL-MCNC: 0.6 MG/DL (ref 0.1–1.5)
BILIRUB UR QL STRIP.AUTO: NEGATIVE
BUN SERPL-MCNC: 11 MG/DL (ref 8–22)
CALCIUM SERPL-MCNC: 9.8 MG/DL (ref 8.5–10.5)
CHLORIDE SERPL-SCNC: 101 MMOL/L (ref 96–112)
CO2 SERPL-SCNC: 20 MMOL/L (ref 20–33)
COLOR UR: YELLOW
CREAT SERPL-MCNC: 0.65 MG/DL (ref 0.5–1.4)
EKG IMPRESSION: NORMAL
EOSINOPHIL # BLD AUTO: 0.04 K/UL (ref 0–0.51)
EOSINOPHIL NFR BLD: 0.2 % (ref 0–6.9)
ERYTHROCYTE [DISTWIDTH] IN BLOOD BY AUTOMATED COUNT: 42.7 FL (ref 35.9–50)
EST. AVERAGE GLUCOSE BLD GHB EST-MCNC: 117 MG/DL
FLUAV RNA SPEC QL NAA+PROBE: POSITIVE
FLUBV RNA SPEC QL NAA+PROBE: NEGATIVE
GLOBULIN SER CALC-MCNC: 3.7 G/DL (ref 1.9–3.5)
GLUCOSE SERPL-MCNC: 126 MG/DL (ref 65–99)
GLUCOSE UR STRIP.AUTO-MCNC: NEGATIVE MG/DL
HBA1C MFR BLD: 5.7 % (ref 0–5.6)
HCT VFR BLD AUTO: 49.3 % (ref 37–47)
HGB BLD-MCNC: 16.8 G/DL (ref 12–16)
IMM GRANULOCYTES # BLD AUTO: 0.36 K/UL (ref 0–0.11)
IMM GRANULOCYTES NFR BLD AUTO: 1.7 % (ref 0–0.9)
KETONES UR STRIP.AUTO-MCNC: ABNORMAL MG/DL
LACTATE BLD-SCNC: 1.1 MMOL/L (ref 0.5–2)
LACTATE BLD-SCNC: 2.6 MMOL/L (ref 0.5–2)
LEUKOCYTE ESTERASE UR QL STRIP.AUTO: NEGATIVE
LYMPHOCYTES # BLD AUTO: 0.36 K/UL (ref 1–4.8)
LYMPHOCYTES NFR BLD: 1.7 % (ref 22–41)
MCH RBC QN AUTO: 31.3 PG (ref 27–33)
MCHC RBC AUTO-ENTMCNC: 34.1 G/DL (ref 33.6–35)
MCV RBC AUTO: 92 FL (ref 81.4–97.8)
MICRO URNS: ABNORMAL
MONOCYTES # BLD AUTO: 0.13 K/UL (ref 0–0.85)
MONOCYTES NFR BLD AUTO: 0.6 % (ref 0–13.4)
NEUTROPHILS # BLD AUTO: 19.96 K/UL (ref 2–7.15)
NEUTROPHILS NFR BLD: 95.3 % (ref 44–72)
NITRITE UR QL STRIP.AUTO: NEGATIVE
NRBC # BLD AUTO: 0 K/UL
NRBC BLD-RTO: 0 /100 WBC
PH UR STRIP.AUTO: 5.5 [PH]
PLATELET # BLD AUTO: 315 K/UL (ref 164–446)
PMV BLD AUTO: 9.5 FL (ref 9–12.9)
POTASSIUM SERPL-SCNC: 3.6 MMOL/L (ref 3.6–5.5)
PROCALCITONIN SERPL-MCNC: 1.54 NG/ML
PROT SERPL-MCNC: 8.2 G/DL (ref 6–8.2)
PROT UR QL STRIP: NEGATIVE MG/DL
RBC # BLD AUTO: 5.36 M/UL (ref 4.2–5.4)
RBC UR QL AUTO: NEGATIVE
SODIUM SERPL-SCNC: 133 MMOL/L (ref 135–145)
SP GR UR STRIP.AUTO: 1.02
UROBILINOGEN UR STRIP.AUTO-MCNC: 0.2 MG/DL
WBC # BLD AUTO: 21 K/UL (ref 4.8–10.8)

## 2019-03-09 PROCEDURE — 700102 HCHG RX REV CODE 250 W/ 637 OVERRIDE(OP): Performed by: FAMILY MEDICINE

## 2019-03-09 PROCEDURE — 71045 X-RAY EXAM CHEST 1 VIEW: CPT

## 2019-03-09 PROCEDURE — B244ZZZ ULTRASONOGRAPHY OF RIGHT HEART: ICD-10-PCS | Performed by: INTERNAL MEDICINE

## 2019-03-09 PROCEDURE — 87502 INFLUENZA DNA AMP PROBE: CPT

## 2019-03-09 PROCEDURE — 99223 1ST HOSP IP/OBS HIGH 75: CPT | Performed by: FAMILY MEDICINE

## 2019-03-09 PROCEDURE — 80053 COMPREHEN METABOLIC PANEL: CPT

## 2019-03-09 PROCEDURE — 770022 HCHG ROOM/CARE - ICU (200)

## 2019-03-09 PROCEDURE — 94640 AIRWAY INHALATION TREATMENT: CPT

## 2019-03-09 PROCEDURE — 83036 HEMOGLOBIN GLYCOSYLATED A1C: CPT

## 2019-03-09 PROCEDURE — 70450 CT HEAD/BRAIN W/O DYE: CPT

## 2019-03-09 PROCEDURE — 99291 CRITICAL CARE FIRST HOUR: CPT | Mod: 25 | Performed by: INTERNAL MEDICINE

## 2019-03-09 PROCEDURE — 87106 FUNGI IDENTIFICATION YEAST: CPT

## 2019-03-09 PROCEDURE — 93005 ELECTROCARDIOGRAM TRACING: CPT | Performed by: EMERGENCY MEDICINE

## 2019-03-09 PROCEDURE — 70486 CT MAXILLOFACIAL W/O DYE: CPT

## 2019-03-09 PROCEDURE — 85025 COMPLETE CBC W/AUTO DIFF WBC: CPT

## 2019-03-09 PROCEDURE — 700111 HCHG RX REV CODE 636 W/ 250 OVERRIDE (IP)

## 2019-03-09 PROCEDURE — 81003 URINALYSIS AUTO W/O SCOPE: CPT

## 2019-03-09 PROCEDURE — 02H633Z INSERTION OF INFUSION DEVICE INTO RIGHT ATRIUM, PERCUTANEOUS APPROACH: ICD-10-PCS | Performed by: INTERNAL MEDICINE

## 2019-03-09 PROCEDURE — 87086 URINE CULTURE/COLONY COUNT: CPT

## 2019-03-09 PROCEDURE — 83605 ASSAY OF LACTIC ACID: CPT | Mod: 91

## 2019-03-09 PROCEDURE — 700111 HCHG RX REV CODE 636 W/ 250 OVERRIDE (IP): Performed by: EMERGENCY MEDICINE

## 2019-03-09 PROCEDURE — 700101 HCHG RX REV CODE 250: Performed by: INTERNAL MEDICINE

## 2019-03-09 PROCEDURE — 93005 ELECTROCARDIOGRAM TRACING: CPT

## 2019-03-09 PROCEDURE — 700105 HCHG RX REV CODE 258: Performed by: FAMILY MEDICINE

## 2019-03-09 PROCEDURE — 700102 HCHG RX REV CODE 250 W/ 637 OVERRIDE(OP): Performed by: EMERGENCY MEDICINE

## 2019-03-09 PROCEDURE — A9270 NON-COVERED ITEM OR SERVICE: HCPCS | Performed by: EMERGENCY MEDICINE

## 2019-03-09 PROCEDURE — 84145 PROCALCITONIN (PCT): CPT

## 2019-03-09 PROCEDURE — A9270 NON-COVERED ITEM OR SERVICE: HCPCS | Performed by: FAMILY MEDICINE

## 2019-03-09 PROCEDURE — 96365 THER/PROPH/DIAG IV INF INIT: CPT

## 2019-03-09 PROCEDURE — 36556 INSERT NON-TUNNEL CV CATH: CPT | Mod: RT | Performed by: INTERNAL MEDICINE

## 2019-03-09 PROCEDURE — 304561 HCHG STAT O2

## 2019-03-09 PROCEDURE — 700101 HCHG RX REV CODE 250

## 2019-03-09 PROCEDURE — 99291 CRITICAL CARE FIRST HOUR: CPT

## 2019-03-09 PROCEDURE — 700105 HCHG RX REV CODE 258: Performed by: EMERGENCY MEDICINE

## 2019-03-09 PROCEDURE — 700111 HCHG RX REV CODE 636 W/ 250 OVERRIDE (IP): Performed by: FAMILY MEDICINE

## 2019-03-09 PROCEDURE — 96375 TX/PRO/DX INJ NEW DRUG ADDON: CPT

## 2019-03-09 PROCEDURE — 87040 BLOOD CULTURE FOR BACTERIA: CPT

## 2019-03-09 RX ORDER — IPRATROPIUM BROMIDE AND ALBUTEROL SULFATE 2.5; .5 MG/3ML; MG/3ML
3 SOLUTION RESPIRATORY (INHALATION) 4 TIMES DAILY
Status: DISCONTINUED | OUTPATIENT
Start: 2019-03-09 | End: 2019-03-09

## 2019-03-09 RX ORDER — LISDEXAMFETAMINE DIMESYLATE CAPSULES 70 MG/1
70 CAPSULE ORAL EVERY MORNING
COMMUNITY
End: 2019-03-27 | Stop reason: SDUPTHER

## 2019-03-09 RX ORDER — SODIUM CHLORIDE 9 MG/ML
30 INJECTION, SOLUTION INTRAVENOUS
Status: COMPLETED | OUTPATIENT
Start: 2019-03-09 | End: 2019-03-09

## 2019-03-09 RX ORDER — DIPHENHYDRAMINE HYDROCHLORIDE 50 MG/ML
50 INJECTION INTRAMUSCULAR; INTRAVENOUS ONCE
Status: COMPLETED | OUTPATIENT
Start: 2019-03-09 | End: 2019-03-09

## 2019-03-09 RX ORDER — ACETAMINOPHEN 325 MG/1
650 TABLET ORAL EVERY 6 HOURS PRN
Status: DISCONTINUED | OUTPATIENT
Start: 2019-03-09 | End: 2019-03-09

## 2019-03-09 RX ORDER — MIDAZOLAM HYDROCHLORIDE 1 MG/ML
1-5 INJECTION INTRAMUSCULAR; INTRAVENOUS ONCE
Status: COMPLETED | OUTPATIENT
Start: 2019-03-09 | End: 2019-03-09

## 2019-03-09 RX ORDER — SODIUM CHLORIDE 9 MG/ML
INJECTION, SOLUTION INTRAVENOUS CONTINUOUS
Status: DISCONTINUED | OUTPATIENT
Start: 2019-03-09 | End: 2019-03-09

## 2019-03-09 RX ORDER — AMOXICILLIN AND CLAVULANATE POTASSIUM 875; 125 MG/1; MG/1
1 TABLET, FILM COATED ORAL 2 TIMES DAILY
COMMUNITY
Start: 2019-03-08 | End: 2019-03-27

## 2019-03-09 RX ORDER — IPRATROPIUM BROMIDE AND ALBUTEROL SULFATE 2.5; .5 MG/3ML; MG/3ML
3 SOLUTION RESPIRATORY (INHALATION)
Status: DISCONTINUED | OUTPATIENT
Start: 2019-03-09 | End: 2019-03-11 | Stop reason: HOSPADM

## 2019-03-09 RX ORDER — ONDANSETRON 2 MG/ML
4 INJECTION INTRAMUSCULAR; INTRAVENOUS EVERY 4 HOURS PRN
Status: DISCONTINUED | OUTPATIENT
Start: 2019-03-09 | End: 2019-03-11 | Stop reason: HOSPADM

## 2019-03-09 RX ORDER — IPRATROPIUM BROMIDE AND ALBUTEROL SULFATE 2.5; .5 MG/3ML; MG/3ML
3 SOLUTION RESPIRATORY (INHALATION)
Status: DISCONTINUED | OUTPATIENT
Start: 2019-03-09 | End: 2019-03-10

## 2019-03-09 RX ORDER — NOREPINEPHRINE BITARTRATE 1 MG/ML
INJECTION, SOLUTION INTRAVENOUS
Status: COMPLETED
Start: 2019-03-09 | End: 2019-03-09

## 2019-03-09 RX ORDER — PROMETHAZINE HYDROCHLORIDE 25 MG/1
12.5-25 TABLET ORAL EVERY 4 HOURS PRN
Status: DISCONTINUED | OUTPATIENT
Start: 2019-03-09 | End: 2019-03-11 | Stop reason: HOSPADM

## 2019-03-09 RX ORDER — SODIUM CHLORIDE 9 MG/ML
1000 INJECTION, SOLUTION INTRAVENOUS
Status: COMPLETED | OUTPATIENT
Start: 2019-03-09 | End: 2019-03-09

## 2019-03-09 RX ORDER — NITROFURANTOIN 25; 75 MG/1; MG/1
100 CAPSULE ORAL 2 TIMES DAILY
COMMUNITY
Start: 2019-02-28 | End: 2019-03-27

## 2019-03-09 RX ORDER — SODIUM CHLORIDE 9 MG/ML
2000 INJECTION, SOLUTION INTRAVENOUS ONCE
Status: COMPLETED | OUTPATIENT
Start: 2019-03-09 | End: 2019-03-09

## 2019-03-09 RX ORDER — AMOXICILLIN 875 MG/1
TABLET, COATED ORAL
COMMUNITY
End: 2019-03-09

## 2019-03-09 RX ORDER — MIDAZOLAM HYDROCHLORIDE 1 MG/ML
INJECTION INTRAMUSCULAR; INTRAVENOUS
Status: COMPLETED
Start: 2019-03-09 | End: 2019-03-09

## 2019-03-09 RX ORDER — OSELTAMIVIR PHOSPHATE 75 MG/1
75 CAPSULE ORAL EVERY 12 HOURS
Status: DISCONTINUED | OUTPATIENT
Start: 2019-03-09 | End: 2019-03-11 | Stop reason: HOSPADM

## 2019-03-09 RX ORDER — MORPHINE SULFATE 4 MG/ML
2 INJECTION, SOLUTION INTRAMUSCULAR; INTRAVENOUS ONCE
Status: COMPLETED | OUTPATIENT
Start: 2019-03-09 | End: 2019-03-09

## 2019-03-09 RX ORDER — ONDANSETRON 2 MG/ML
4 INJECTION INTRAMUSCULAR; INTRAVENOUS ONCE
Status: COMPLETED | OUTPATIENT
Start: 2019-03-09 | End: 2019-03-09

## 2019-03-09 RX ORDER — PROMETHAZINE HYDROCHLORIDE 25 MG/1
12.5-25 SUPPOSITORY RECTAL EVERY 4 HOURS PRN
Status: DISCONTINUED | OUTPATIENT
Start: 2019-03-09 | End: 2019-03-11 | Stop reason: HOSPADM

## 2019-03-09 RX ORDER — OSELTAMIVIR PHOSPHATE 75 MG/1
75 CAPSULE ORAL EVERY 12 HOURS
Status: DISCONTINUED | OUTPATIENT
Start: 2019-03-09 | End: 2019-03-09

## 2019-03-09 RX ORDER — SODIUM CHLORIDE 9 MG/ML
30 INJECTION, SOLUTION INTRAVENOUS
Status: DISCONTINUED | OUTPATIENT
Start: 2019-03-09 | End: 2019-03-09

## 2019-03-09 RX ORDER — SODIUM CHLORIDE AND POTASSIUM CHLORIDE 150; 900 MG/100ML; MG/100ML
INJECTION, SOLUTION INTRAVENOUS CONTINUOUS
Status: DISCONTINUED | OUTPATIENT
Start: 2019-03-09 | End: 2019-03-11 | Stop reason: HOSPADM

## 2019-03-09 RX ORDER — ONDANSETRON 4 MG/1
4 TABLET, ORALLY DISINTEGRATING ORAL EVERY 4 HOURS PRN
Status: DISCONTINUED | OUTPATIENT
Start: 2019-03-09 | End: 2019-03-11 | Stop reason: HOSPADM

## 2019-03-09 RX ORDER — OSELTAMIVIR PHOSPHATE 75 MG/1
75 CAPSULE ORAL ONCE
Status: COMPLETED | OUTPATIENT
Start: 2019-03-09 | End: 2019-03-09

## 2019-03-09 RX ORDER — ACETAMINOPHEN 500 MG
1000 TABLET ORAL ONCE
Status: DISCONTINUED | OUTPATIENT
Start: 2019-03-09 | End: 2019-03-09

## 2019-03-09 RX ORDER — IBUPROFEN 600 MG/1
600 TABLET ORAL ONCE
Status: COMPLETED | OUTPATIENT
Start: 2019-03-09 | End: 2019-03-09

## 2019-03-09 RX ADMIN — POTASSIUM CHLORIDE AND SODIUM CHLORIDE: 900; 150 INJECTION, SOLUTION INTRAVENOUS at 20:33

## 2019-03-09 RX ADMIN — OSELTAMIVIR PHOSPHATE 75 MG: 75 CAPSULE ORAL at 14:53

## 2019-03-09 RX ADMIN — SODIUM CHLORIDE 1000 ML: 9 INJECTION, SOLUTION INTRAVENOUS at 16:20

## 2019-03-09 RX ADMIN — MIDAZOLAM HYDROCHLORIDE 1 MG: 1 INJECTION, SOLUTION INTRAMUSCULAR; INTRAVENOUS at 18:27

## 2019-03-09 RX ADMIN — OSELTAMIVIR PHOSPHATE 75 MG: 75 CAPSULE ORAL at 20:28

## 2019-03-09 RX ADMIN — ONDANSETRON 4 MG: 2 INJECTION INTRAMUSCULAR; INTRAVENOUS at 13:01

## 2019-03-09 RX ADMIN — ENOXAPARIN SODIUM 40 MG: 100 INJECTION SUBCUTANEOUS at 17:31

## 2019-03-09 RX ADMIN — NOREPINEPHRINE BITARTRATE 8 MG: 1 INJECTION INTRAVENOUS at 17:45

## 2019-03-09 RX ADMIN — SODIUM CHLORIDE 1932 ML: 9 INJECTION, SOLUTION INTRAVENOUS at 12:45

## 2019-03-09 RX ADMIN — IBUPROFEN 600 MG: 600 TABLET ORAL at 13:38

## 2019-03-09 RX ADMIN — CEFTRIAXONE SODIUM 2 G: 2 INJECTION, POWDER, FOR SOLUTION INTRAMUSCULAR; INTRAVENOUS at 14:53

## 2019-03-09 RX ADMIN — IPRATROPIUM BROMIDE AND ALBUTEROL SULFATE 3 ML: .5; 3 SOLUTION RESPIRATORY (INHALATION) at 19:15

## 2019-03-09 RX ADMIN — SODIUM CHLORIDE: 9 INJECTION, SOLUTION INTRAVENOUS at 17:27

## 2019-03-09 RX ADMIN — MORPHINE SULFATE 2 MG: 4 INJECTION INTRAVENOUS at 13:00

## 2019-03-09 RX ADMIN — MIDAZOLAM HYDROCHLORIDE 1 MG: 1 INJECTION INTRAMUSCULAR; INTRAVENOUS at 18:27

## 2019-03-09 RX ADMIN — DIPHENHYDRAMINE HYDROCHLORIDE 25 MG: 50 INJECTION INTRAMUSCULAR; INTRAVENOUS at 16:15

## 2019-03-09 RX ADMIN — SODIUM CHLORIDE 2000 ML: 9 INJECTION, SOLUTION INTRAVENOUS at 15:00

## 2019-03-09 ASSESSMENT — ENCOUNTER SYMPTOMS
ABDOMINAL PAIN: 0
PALPITATIONS: 0
COUGH: 0
STRIDOR: 0
MYALGIAS: 1
HALLUCINATIONS: 0
WEAKNESS: 1
DIARRHEA: 1
SHORTNESS OF BREATH: 1
CLAUDICATION: 0
ORTHOPNEA: 0
NECK PAIN: 0
VOMITING: 0
HEMOPTYSIS: 0
BACK PAIN: 0
PHOTOPHOBIA: 0
SPUTUM PRODUCTION: 1
HEADACHES: 0
DOUBLE VISION: 0
CHILLS: 1
DIZZINESS: 1
BLURRED VISION: 0
DEPRESSION: 0
HEARTBURN: 0
SEIZURES: 0
FEVER: 1
SPEECH CHANGE: 0
NAUSEA: 0
FOCAL WEAKNESS: 0
NERVOUS/ANXIOUS: 0
BRUISES/BLEEDS EASILY: 0
COUGH: 1

## 2019-03-09 ASSESSMENT — COPD QUESTIONNAIRES
DO YOU EVER COUGH UP ANY MUCUS OR PHLEGM?: NO/ONLY WITH OCCASIONAL COLDS OR INFECTIONS
DURING THE PAST 4 WEEKS HOW MUCH DID YOU FEEL SHORT OF BREATH: SOME OF THE TIME
HAVE YOU SMOKED AT LEAST 100 CIGARETTES IN YOUR ENTIRE LIFE: NO/DON'T KNOW
COPD SCREENING SCORE: 1

## 2019-03-09 ASSESSMENT — LIFESTYLE VARIABLES
EVER_SMOKED: NEVER
SUBSTANCE_ABUSE: 0

## 2019-03-09 NOTE — ED NOTES
BP 75/43, rechecked BP on other arm and was 73/44, manual BP confirmed low BP, second IV started, IVF infusing, ERP notified.  Pt awake and alert, c/o of a headache.

## 2019-03-09 NOTE — ED NOTES
At the time of administration of Rocephin and Tamiflu, the pt was complaining of dizziness. The pt's vital signs were taken and recorded per the chart. A neuro exam was done and the pt was AOx4, mild dizziness, pupils DANELLE, no headache, no SOB, and strength equal on both right and left sides. The pt's lung sounds were clear and skin signs showed mild urticaria on both arms and redness on the chest. The ERP was made aware of the change in the patient's condition and orders were placed. Hospitalist is currently at bedside.

## 2019-03-09 NOTE — ED TRIAGE NOTES
"Chief Complaint   Patient presents with   • ALOC     \"I just don't feel like myself\"   • Fever     100.8 in triage. Taking antibiotics for upper respiratory/otitis media     Tearful in triage. Back for EKG. SIRS score 3. Explained triage process, to waiting room. Asked to inform RN if questions or concerns arise.   "

## 2019-03-09 NOTE — ED PROVIDER NOTES
"ED Provider Note    CHIEF COMPLAINT  Chief Complaint   Patient presents with   • ALOC     \"I just don't feel like myself\"   • Fever     100.8 in triage. Taking antibiotics for upper respiratory/otitis media       HPI  Britt Subramanian is a 48 y.o. female who presents for evaluation of fever along with feeling altered.  Six days ago the patient began developing fever along with nasal congestion, pressure in her sinuses along with bilateral ear pain.  The patient was seen by her primary care physician and prescribed amoxicillin, which she has completed.  In addition, the patient was diagnosed with a concomitant urinary tract infection and placed on Bactrim, but states she only took 1 dose of this.  She developed increased symptoms today with complaints of generalized malaise, myalgias, arthralgias, headache, nausea, diarrhea, fever, chills, photophobia along with blurred vision.    REVIEW OF SYSTEMS  See HPI for further details. All other systems negative.    PAST MEDICAL HISTORY  Past Medical History:   Diagnosis Date   • Celiac disease    • Celiac disease 6/22/2018    Diagnosed by GI in [place] by official testing and endoscopy. Doing well with gluten free diet.   • History of insulin resistance 2012   • Mild persistent asthma without complication 6/22/2018    Exercise induced asthma. Only with strenuous exercise. PRN Albuterol inhaler.    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    • Vitamin D deficiency 6/22/2018    Taking OTC.        FAMILY HISTORY  Family History   Problem Relation Age of Onset   • Other Mother         DVTs, IVC filter in place   • Diabetes Father    • Other Father         gout   • No Known Problems Sister    • Diabetes Brother    • Cancer Maternal Grandmother         skin cancer   • Breast Cancer Maternal Aunt        SOCIAL HISTORY  Denies: tobacco, alcohol, drug;    SURGICAL HISTORY  Past Surgical History: "   Procedure Laterality Date   • HYSTERECTOMY, VAGINAL  2010    heavy menses   • PB ENLARGE BREAST WITH IMPLANT  2009   • PB REDUCTION OF LARGE BREAST  2009   • APPENDECTOMY  1999       CURRENT MEDICATIONS  Home Medications     Reviewed by Trevon Farr R.N. (Registered Nurse) on 03/09/19 at 1144  Med List Status: Partial   Medication Last Dose Status   amoxicillin (AMOXIL) 875 MG tablet  Active   ibuprofen (MOTRIN) 600 MG Tab  Active   metFORMIN ER (GLUCOPHAGE XR) 500 MG TABLET SR 24 HR  Active                ALLERGIES  No Known Allergies    PHYSICAL EXAM  VITAL SIGNS: /87   Pulse (!) (P) 123   Temp (!) 38.2 °C (100.8 °F)   Resp (!) (P) 25   Wt 64.4 kg (142 lb)   SpO2 (P) 96%   BMI 20.97 kg/m²    Constitutional: 48-year-old female, appears weak, drowsy, appears uncomfortable, oriented x3  HENT: ,Atraumatic, Bilateral external ears normal, tympanic membranes clear, Oropharynx mildly dry, No oral exudates, Nose normal.   Eyes: PERRL, EOMI, Conjunctiva normal, No discharge.   Neck: Normal range of motion, No tenderness, Supple, No stridor.   Lymphatic: No lymphadenopathy noted.   Cardiovascular: Tachycardia heart rate, Normal rhythm, No murmurs, No rubs, No gallops.   Thorax & Lungs: Normal Equal breath sounds, No respiratory distress, No wheezing, no stridor, no rales. No chest tenderness.   Abdomen: Soft, nontender, nondistended, no organomegaly, positive bowel sounds normal in quality. No guarding or rebound.  Skin: Decreased skin turgor, pink, warm, dry. No rashes, petechiae, purpura. Normal capillary refill.   Back: No tenderness, No CVA tenderness.   Extremities: Intact distal pulses, No edema, No tenderness, No cyanosis, No clubbing. Vascular: Pulses are 2+, symmetric in the upper and lower extremities.  Musculoskeletal: Good range of motion in all major joints. No tenderness to palpation or major deformities noted.   Neurologic: Alert & oriented x 3, Normal motor function, Normal sensory  function, No gross focal deficits noted.   Psychiatric: Affect normal, Judgment normal, Mood normal.       RADIOLOGY/PROCEDURES  CT-MAXILLOFACIAL W/O PLUS RECONS   Final Result         Paranasal sinus disease as described. Air-fluid levels in the maxillary sinuses suggests the possibility of acute sinusitis      CT-HEAD W/O   Final Result      1.  No acute intracranial findings.      2.  Ethmoid and maxillary sinus disease. There are phleboliths in the maxillary sinuses suggest possible acute sinusitis         DX-CHEST-PORTABLE (1 VIEW)   Final Result      1.  There is no acute cardiopulmonary process.            COURSE & MEDICAL DECISION MAKING  Pertinent Labs & Imaging studies reviewed. (See chart for details)  1.  Monitor  2.  IV normal saline; IV fluids administered for sepsis and thus is not a candidate for oral rehydration or treatment; reevaluation revealed stable status  3.  Ceftriaxone 2 g IV  4.  Tamiflu    Laboratory studies: CBC shows white count of 21.0, 95% neutrophils, 0.36% immature granulocytes, 1% lymphocytes, hemoglobin 16.8, hematocrit 49.3; lactic acid 2.6; CMP shows sodium 133, random glucose 126, otherwise within normal; influenza A is positive; urinalysis is negative for nitrite and leukocyte esterase;    Discussion/consultation: At this time, the patient presents for evaluation of fever.  The patient is showing signs of sepsis and was treated with IV fluids and IV antibiotics.  CT scanning shows evidence of sinusitis.  In addition, the patient has influenza A.  I spoke with the hospitalist on call.  I spent 35 minutes in bedside attendance evaluated patient, reassessing patient, initiating workup, implementing treatment, reviewing response to treatment, speaking with consultants.  The patient will be admitted for further monitoring, treatment, and care.    FINAL IMPRESSION  1. Sepsis, due to unspecified organism (HCC)    2. Acute sinusitis, recurrence not specified, unspecified location     3. Influenza A    4.      Critical care time, 35 minutes       PLAN  1.  Patient will be admitted for further monitoring, treatment, and care.    Electronically signed by: Guy G Gansert, 3/9/2019 12:21 PM

## 2019-03-10 PROBLEM — E83.39 HYPOPHOSPHATEMIA: Status: ACTIVE | Noted: 2019-03-10

## 2019-03-10 PROBLEM — E83.42 HYPOMAGNESEMIA: Status: ACTIVE | Noted: 2019-03-10

## 2019-03-10 LAB
ANION GAP SERPL CALC-SCNC: 6 MMOL/L (ref 0–11.9)
APTT PPP: 39 SEC (ref 24.7–36)
BASOPHILS # BLD AUTO: 0.4 % (ref 0–1.8)
BASOPHILS # BLD: 0.05 K/UL (ref 0–0.12)
BUN SERPL-MCNC: 6 MG/DL (ref 8–22)
CALCIUM SERPL-MCNC: 7.1 MG/DL (ref 8.5–10.5)
CHLORIDE SERPL-SCNC: 113 MMOL/L (ref 96–112)
CO2 SERPL-SCNC: 19 MMOL/L (ref 20–33)
CREAT SERPL-MCNC: 0.51 MG/DL (ref 0.5–1.4)
EOSINOPHIL # BLD AUTO: 0.23 K/UL (ref 0–0.51)
EOSINOPHIL NFR BLD: 1.9 % (ref 0–6.9)
ERYTHROCYTE [DISTWIDTH] IN BLOOD BY AUTOMATED COUNT: 45.8 FL (ref 35.9–50)
GLUCOSE SERPL-MCNC: 104 MG/DL (ref 65–99)
HCT VFR BLD AUTO: 36.8 % (ref 37–47)
HGB BLD-MCNC: 12.3 G/DL (ref 12–16)
IMM GRANULOCYTES # BLD AUTO: 0.22 K/UL (ref 0–0.11)
IMM GRANULOCYTES NFR BLD AUTO: 1.8 % (ref 0–0.9)
INR PPP: 1.5 (ref 0.87–1.13)
LYMPHOCYTES # BLD AUTO: 0.89 K/UL (ref 1–4.8)
LYMPHOCYTES NFR BLD: 7.4 % (ref 22–41)
MAGNESIUM SERPL-MCNC: 1.5 MG/DL (ref 1.5–2.5)
MCH RBC QN AUTO: 31.6 PG (ref 27–33)
MCHC RBC AUTO-ENTMCNC: 33.4 G/DL (ref 33.6–35)
MCV RBC AUTO: 94.6 FL (ref 81.4–97.8)
MONOCYTES # BLD AUTO: 0.29 K/UL (ref 0–0.85)
MONOCYTES NFR BLD AUTO: 2.4 % (ref 0–13.4)
NEUTROPHILS # BLD AUTO: 10.32 K/UL (ref 2–7.15)
NEUTROPHILS NFR BLD: 86.1 % (ref 44–72)
NRBC # BLD AUTO: 0 K/UL
NRBC BLD-RTO: 0 /100 WBC
PHOSPHATE SERPL-MCNC: 1.9 MG/DL (ref 2.5–4.5)
PLATELET # BLD AUTO: 244 K/UL (ref 164–446)
PMV BLD AUTO: 10.4 FL (ref 9–12.9)
POTASSIUM SERPL-SCNC: 3.9 MMOL/L (ref 3.6–5.5)
PROTHROMBIN TIME: 18.2 SEC (ref 12–14.6)
RBC # BLD AUTO: 3.89 M/UL (ref 4.2–5.4)
SODIUM SERPL-SCNC: 138 MMOL/L (ref 135–145)
WBC # BLD AUTO: 12 K/UL (ref 4.8–10.8)

## 2019-03-10 PROCEDURE — 99232 SBSQ HOSP IP/OBS MODERATE 35: CPT | Performed by: HOSPITALIST

## 2019-03-10 PROCEDURE — 700111 HCHG RX REV CODE 636 W/ 250 OVERRIDE (IP): Performed by: FAMILY MEDICINE

## 2019-03-10 PROCEDURE — A9270 NON-COVERED ITEM OR SERVICE: HCPCS | Performed by: FAMILY MEDICINE

## 2019-03-10 PROCEDURE — 700102 HCHG RX REV CODE 250 W/ 637 OVERRIDE(OP): Performed by: FAMILY MEDICINE

## 2019-03-10 PROCEDURE — C1751 CATH, INF, PER/CENT/MIDLINE: HCPCS

## 2019-03-10 PROCEDURE — 85025 COMPLETE CBC W/AUTO DIFF WBC: CPT

## 2019-03-10 PROCEDURE — 700101 HCHG RX REV CODE 250: Performed by: INTERNAL MEDICINE

## 2019-03-10 PROCEDURE — 83735 ASSAY OF MAGNESIUM: CPT

## 2019-03-10 PROCEDURE — 84100 ASSAY OF PHOSPHORUS: CPT

## 2019-03-10 PROCEDURE — 36556 INSERT NON-TUNNEL CV CATH: CPT

## 2019-03-10 PROCEDURE — 700105 HCHG RX REV CODE 258: Performed by: INTERNAL MEDICINE

## 2019-03-10 PROCEDURE — 99291 CRITICAL CARE FIRST HOUR: CPT | Performed by: INTERNAL MEDICINE

## 2019-03-10 PROCEDURE — 94640 AIRWAY INHALATION TREATMENT: CPT

## 2019-03-10 PROCEDURE — 85730 THROMBOPLASTIN TIME PARTIAL: CPT

## 2019-03-10 PROCEDURE — 700111 HCHG RX REV CODE 636 W/ 250 OVERRIDE (IP): Performed by: INTERNAL MEDICINE

## 2019-03-10 PROCEDURE — 80048 BASIC METABOLIC PNL TOTAL CA: CPT

## 2019-03-10 PROCEDURE — 770021 HCHG ROOM/CARE - ISO PRIVATE

## 2019-03-10 PROCEDURE — 700105 HCHG RX REV CODE 258: Performed by: FAMILY MEDICINE

## 2019-03-10 PROCEDURE — 85610 PROTHROMBIN TIME: CPT

## 2019-03-10 RX ORDER — MAGNESIUM SULFATE HEPTAHYDRATE 40 MG/ML
4 INJECTION, SOLUTION INTRAVENOUS ONCE
Status: COMPLETED | OUTPATIENT
Start: 2019-03-10 | End: 2019-03-10

## 2019-03-10 RX ORDER — IPRATROPIUM BROMIDE AND ALBUTEROL SULFATE 2.5; .5 MG/3ML; MG/3ML
3 SOLUTION RESPIRATORY (INHALATION)
Status: DISCONTINUED | OUTPATIENT
Start: 2019-03-10 | End: 2019-03-10

## 2019-03-10 RX ADMIN — MAGNESIUM SULFATE IN WATER 4 G: 40 INJECTION, SOLUTION INTRAVENOUS at 07:52

## 2019-03-10 RX ADMIN — SODIUM PHOSPHATE, MONOBASIC, MONOHYDRATE AND SODIUM PHOSPHATE, DIBASIC, ANHYDROUS 30 MMOL: 276; 142 INJECTION, SOLUTION INTRAVENOUS at 09:46

## 2019-03-10 RX ADMIN — POTASSIUM CHLORIDE AND SODIUM CHLORIDE: 900; 150 INJECTION, SOLUTION INTRAVENOUS at 17:24

## 2019-03-10 RX ADMIN — ENOXAPARIN SODIUM 40 MG: 100 INJECTION SUBCUTANEOUS at 06:33

## 2019-03-10 RX ADMIN — IPRATROPIUM BROMIDE AND ALBUTEROL SULFATE 3 ML: .5; 3 SOLUTION RESPIRATORY (INHALATION) at 07:29

## 2019-03-10 RX ADMIN — IPRATROPIUM BROMIDE AND ALBUTEROL SULFATE 3 ML: .5; 3 SOLUTION RESPIRATORY (INHALATION) at 11:09

## 2019-03-10 RX ADMIN — IPRATROPIUM BROMIDE AND ALBUTEROL SULFATE 3 ML: .5; 3 SOLUTION RESPIRATORY (INHALATION) at 18:55

## 2019-03-10 RX ADMIN — OSELTAMIVIR PHOSPHATE 75 MG: 75 CAPSULE ORAL at 17:31

## 2019-03-10 RX ADMIN — CEFTRIAXONE SODIUM 2 G: 2 INJECTION, POWDER, FOR SOLUTION INTRAMUSCULAR; INTRAVENOUS at 15:26

## 2019-03-10 RX ADMIN — POTASSIUM CHLORIDE AND SODIUM CHLORIDE: 900; 150 INJECTION, SOLUTION INTRAVENOUS at 04:08

## 2019-03-10 RX ADMIN — OSELTAMIVIR PHOSPHATE 75 MG: 75 CAPSULE ORAL at 06:33

## 2019-03-10 ASSESSMENT — ENCOUNTER SYMPTOMS
BLURRED VISION: 0
BRUISES/BLEEDS EASILY: 0
WEAKNESS: 1
NERVOUS/ANXIOUS: 0
SEIZURES: 0
PHOTOPHOBIA: 0
FEVER: 1
BLOOD IN STOOL: 0
HALLUCINATIONS: 0
STRIDOR: 0
DIZZINESS: 0
SHORTNESS OF BREATH: 1
FOCAL WEAKNESS: 0
ORTHOPNEA: 0
ABDOMINAL PAIN: 0
VOMITING: 0
SPEECH CHANGE: 0
BACK PAIN: 0
CLAUDICATION: 0
CHILLS: 1
MYALGIAS: 1
DOUBLE VISION: 0
COUGH: 1
SPUTUM PRODUCTION: 1

## 2019-03-10 ASSESSMENT — LIFESTYLE VARIABLES
HAVE YOU EVER FELT YOU SHOULD CUT DOWN ON YOUR DRINKING: NO
HAVE YOU EVER FELT YOU SHOULD CUT DOWN ON YOUR DRINKING: NO
HAVE PEOPLE ANNOYED YOU BY CRITICIZING YOUR DRINKING: NO
CONSUMPTION TOTAL: INCOMPLETE
EVER HAD A DRINK FIRST THING IN THE MORNING TO STEADY YOUR NERVES TO GET RID OF A HANGOVER: NO
ALCOHOL_USE: YES
TOTAL SCORE: 0
AVERAGE NUMBER OF DAYS PER WEEK YOU HAVE A DRINK CONTAINING ALCOHOL: 1
CONSUMPTION TOTAL: NEGATIVE
TOTAL SCORE: 0
EVER FELT BAD OR GUILTY ABOUT YOUR DRINKING: NO
TOTAL SCORE: 0
ALCOHOL_USE: YES
EVER FELT BAD OR GUILTY ABOUT YOUR DRINKING: NO
TOTAL SCORE: 0
TOTAL SCORE: 0
EVER HAD A DRINK FIRST THING IN THE MORNING TO STEADY YOUR NERVES TO GET RID OF A HANGOVER: NO
HAVE PEOPLE ANNOYED YOU BY CRITICIZING YOUR DRINKING: NO
HOW MANY TIMES IN THE PAST YEAR HAVE YOU HAD 5 OR MORE DRINKS IN A DAY: 0
ON A TYPICAL DAY WHEN YOU DRINK ALCOHOL HOW MANY DRINKS DO YOU HAVE: 0
TOTAL SCORE: 0

## 2019-03-10 ASSESSMENT — COGNITIVE AND FUNCTIONAL STATUS - GENERAL
SUGGESTED CMS G CODE MODIFIER MOBILITY: CJ
WALKING IN HOSPITAL ROOM: A LITTLE
MOBILITY SCORE: 22
TOILETING: A LITTLE
STANDING UP FROM CHAIR USING ARMS: A LITTLE
SUGGESTED CMS G CODE MODIFIER DAILY ACTIVITY: CI
DAILY ACTIVITIY SCORE: 23

## 2019-03-10 ASSESSMENT — PATIENT HEALTH QUESTIONNAIRE - PHQ9
SUM OF ALL RESPONSES TO PHQ9 QUESTIONS 1 AND 2: 0
2. FEELING DOWN, DEPRESSED, IRRITABLE, OR HOPELESS: NOT AT ALL
1. LITTLE INTEREST OR PLEASURE IN DOING THINGS: NOT AT ALL

## 2019-03-10 NOTE — CARE PLAN
Problem: Safety  Goal: Will remain free from injury  Outcome: PROGRESSING AS EXPECTED  Patient expresses understanding of all safety interventions.     Problem: Infection  Goal: Will remain free from infection  Outcome: PROGRESSING AS EXPECTED  Patient educated on sepsis and given information regarding norepinephrine.

## 2019-03-10 NOTE — H&P
Hospital Medicine History & Physical Note    Date of Service  3/9/2019    Primary Care Physician  Kelle Tovar    Consultants  Critical care    Code Status  Full code    Chief Complaint  Generalized weakness, myalgias and arthralgias, fever and chills    History of Presenting Illness  48 y.o. female with past medical history of celiac disease, will controlled type 2 diabetes mellitus on metformin comes in due to generalized weakness, arthralgia, myalgias, fever and chills.  According to the patient, she went to her PCP a few days ago with similar symptoms.  She was given a prescription for amoxicillin.  There was also questionable UTI and was given a scription for Bactrim as she stated.  She took the antibiotics but with no improvement.  Her symptoms worsen gradually over the course of time.  On the day of admission she was dizzy and lethargic.  Her coworkers noted that she is confused and disoriented.  She comes in was found to be hypotensive and septic.  Chest x-ray did not show any acute cardiopulmonary process.  UA was negative for UTI.  CT scan of the head was negative for any acute intracranial abnormalities.  CT maxillofacial showed mucosal thickening in the ethmoid and maxillary sinuses consistent with acute sinusitis.  Her lactic acid is what 2.6.  Influenza screening was positive for influenza A.  Was started on aggressive resuscitation with IV fluids.  Received adequate amounts of IV fluids per sepsis protocol however, blood pressure continues to be low.  Patient was symptomatic and having dizziness and lightheadedness.  Patient was admitted to the intensive care unit and started on Levophed for septic shock.  Denies any chest pain.  She was having occasional nausea and vomiting.  Denies abdominal pain.  She admits to occasional diarrhea over the last few days.  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    Review of Systems  Review of Systems   Constitutional: Positive for chills,  "fever and malaise/fatigue.   HENT: Negative for ear pain, hearing loss and tinnitus.    Eyes: Negative for blurred vision and double vision.   Respiratory: Positive for shortness of breath. Negative for cough and hemoptysis.    Cardiovascular: Negative for chest pain, palpitations and orthopnea.   Gastrointestinal: Negative for heartburn and nausea.   Genitourinary: Negative for dysuria and urgency.   Musculoskeletal: Positive for joint pain and myalgias.   Skin: Negative for rash.   Neurological: Positive for dizziness and weakness. Negative for headaches.   Endo/Heme/Allergies: Does not bruise/bleed easily.   Psychiatric/Behavioral: Negative for depression and suicidal ideas.       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    Surgical History   has a past surgical history that includes hysterectomy, vaginal (2010); appendectomy (1999); pr enlarge breast with implant (2009); and pr reduction of large breast (2009).     Family History  family history includes Breast Cancer in her maternal aunt; Cancer in her maternal grandmother; Diabetes in her brother and father; No Known Problems in her sister; Other in her father and mother.     Social History   reports that she has never smoked. She has never used smokeless tobacco. She reports that she does not drink alcohol or use drugs.    Allergies  Allergies   Allergen Reactions   • Gluten Meal Unspecified     Pt has celiac    • Tylenol Shortness of Breath     Pt states, \"it makes me wheeze\"       Medications  Prior to Admission Medications   Prescriptions Last Dose Informant Patient Reported? Taking?   DM-Phenylephrine-Acetaminophen (THERAFLU SEVERE COLD/CGH DAY) 10-5-325 MG Tab 3/9/2019 at am Patient Yes Yes   Sig: Take 1-2 Tabs by mouth 2 times a day as needed " (cold/flu symptoms).   Phenylephrine-DM-GG-APAP (THERAFLU EXPRESSMAX SEV CLD/FL) 5--325 MG/15ML Liquid 3/8/2019 at pm Patient Yes Yes   Sig: Take 15-30 mL by mouth 2 times a day as needed (cold/flu symptoms).   amoxicillin-clavulanate (AUGMENTIN) 875-125 MG Tab 3/9/2019 at am Patient's Home Pharmacy Yes Yes   Sig: Take 1 Tab by mouth 2 times a day.   lisdexamfetamine (VYVANSE) 70 MG capsule 3/9/2019 at am Patient Yes Yes   Sig: Take 70 mg by mouth every morning.   metFORMIN ER (GLUCOPHAGE XR) 500 MG TABLET SR 24 HR 3/9/2019 at am Patient No No   Sig: Take 1 Tab by mouth every day for 90 days.   nitrofurantoin monohydr macro (MACROBID) 100 MG Cap 3/8/2019 at FINISHED Patient's Home Pharmacy Yes Yes   Sig: Take 100 mg by mouth 2 times a day.      Facility-Administered Medications: None       Physical Exam  Temp:  [37.1 °C (98.7 °F)-39.2 °C (102.6 °F)] 37.6 °C (99.7 °F)  Pulse:  [] 101  Resp:  [15-36] 32  BP: (135)/(87) 135/87  SpO2:  [87 %-99 %] 94 %    Physical Exam   Constitutional: She is oriented to person, place, and time. She appears lethargic. No distress.   HENT:   Head: Normocephalic and atraumatic.   Mouth/Throat: No oropharyngeal exudate.   Eyes: Pupils are equal, round, and reactive to light. No scleral icterus.   Neck: Normal range of motion. No tracheal deviation present. No thyromegaly present.   Cardiovascular: Tachycardia present.  Exam reveals no gallop and no friction rub.    Pulmonary/Chest: Effort normal. Tachypnea noted. No respiratory distress. She has decreased breath sounds. She has no wheezes.   Abdominal: Soft. She exhibits no distension. There is no tenderness.   Musculoskeletal: She exhibits no tenderness or deformity.   Neurological: She is oriented to person, place, and time. She appears lethargic.   Skin: Skin is warm and dry. She is not diaphoretic.   Psychiatric: Her mood appears anxious.       Laboratory:  Recent Labs      03/09/19   1208   WBC  21.0*   RBC  5.36    HEMOGLOBIN  16.8*   HEMATOCRIT  49.3*   MCV  92.0   MCH  31.3   MCHC  34.1   RDW  42.7   PLATELETCT  315   MPV  9.5     Recent Labs      03/09/19   1208   SODIUM  133*   POTASSIUM  3.6   CHLORIDE  101   CO2  20   GLUCOSE  126*   BUN  11   CREATININE  0.65   CALCIUM  9.8     Recent Labs      03/09/19   1208   ALTSGPT  25   ASTSGOT  21   ALKPHOSPHAT  71   TBILIRUBIN  0.6   GLUCOSE  126*                 No results for input(s): TROPONINI in the last 72 hours.    Urinalysis:    Recent Labs      03/09/19   1345   SPECGRAVITY  1.018   GLUCOSEUR  Negative   KETONES  Trace*   NITRITE  Negative   LEUKESTERAS  Negative        Imaging:  DX-CHEST-LIMITED (1 VIEW)   Final Result      1.  Interval placement of right IJ catheter with the tip projecting over the right atrium.   2.  There are changes consistent with mild vascular congestion/edema.      CT-MAXILLOFACIAL W/O PLUS RECONS   Final Result         Paranasal sinus disease as described. Air-fluid levels in the maxillary sinuses suggests the possibility of acute sinusitis      CT-HEAD W/O   Final Result      1.  No acute intracranial findings.      2.  Ethmoid and maxillary sinus disease. There are phleboliths in the maxillary sinuses suggest possible acute sinusitis         DX-CHEST-PORTABLE (1 VIEW)   Final Result      1.  There is no acute cardiopulmonary process.            Assessment/Plan:  I anticipate this patient will require at least two midnights for appropriate medical management, necessitating inpatient admission.    * Septic shock (HCC)   Assessment & Plan    This is severe sepsis with the following associated acute organ dysfunction(s): metabolic/septic encephalopathy.   This is likely secondary to influenza a.  Continue with aggressive resuscitation with IV fluids.  Pancultures obtained.  Continue with Tamiflu and Rocephin.  Continue with Levophed and maintain MAP above 65.     Acute maxillary sinusitis   Assessment & Plan    As seen on CT scan  facial.  Continue with IV Rocephin.     Influenza A   Assessment & Plan    Completed course of Tamiflu.     Type 2 diabetes mellitus (HCC)- (present on admission)   Assessment & Plan    Hemoglobin A1c 5.7.  Well-controlled.  Monitor blood glucoses.  Initiate sliding scale insulin if blood glucoses exceed 150 mg/dL.     Polycythemia- (present on admission)   Assessment & Plan    Monitor     Hyponatremia- (present on admission)   Assessment & Plan    Likely secondary to above.  Mild.  Continue to monitor     History of attention deficit disorder- (present on admission)   Assessment & Plan    On Vyvanse     Celiac disease- (present on admission)   Assessment & Plan    History of.  Gluten-free diet per         VTE prophylaxis: Lovenox

## 2019-03-10 NOTE — CARE PLAN
Problem: Infection  Goal: Will remain free from infection    Intervention: Assess signs and symptoms of infection  Pt with low grade temp. MAP improved with fluid resuscitation and Levophed. Will wean Levophed as tolerated  Intervention: Implement standard precautions and perform hand washing before and after patient contact  Pt informed of droplet precaution due to +Influenza A and need for staff and visitors to wear mask. Isolation cart ordered

## 2019-03-10 NOTE — PROGRESS NOTES
Critical Care Progress Note    Date of admission  3/9/2019    Chief Complaint  48 y.o. female admitted 3/9/2019 with flulike symptoms.    Hospital Course    This lady was admitted to the ICU with septic shock.      Interval Problem Update  Reviewed last 24 hour events:      NE titrating  SR  NS with 20 K  Oriented x 4  2 L NC  Replete Mg and PO4      Review of Systems  Review of Systems   Constitutional: Positive for chills, fever and malaise/fatigue.   HENT: Negative for ear discharge and nosebleeds.    Eyes: Negative for blurred vision, double vision and photophobia.   Respiratory: Positive for cough, sputum production and shortness of breath. Negative for stridor.    Cardiovascular: Negative for chest pain, orthopnea and claudication.   Gastrointestinal: Negative for abdominal pain, blood in stool and vomiting.   Genitourinary: Negative for dysuria, hematuria and urgency.   Musculoskeletal: Positive for myalgias. Negative for back pain.   Skin: Negative for rash.   Neurological: Positive for weakness. Negative for dizziness, speech change, focal weakness and seizures.   Endo/Heme/Allergies: Does not bruise/bleed easily.   Psychiatric/Behavioral: Negative for hallucinations and suicidal ideas. The patient is not nervous/anxious.         Vital Signs for last 24 hours   Temp:  [37.1 °C (98.7 °F)-39.2 °C (102.6 °F)] 37.3 °C (99.2 °F)  Pulse:  [] 92  Resp:  [15-64] 28  BP: (135)/(87) 135/87  SpO2:  [87 %-99 %] 97 %    Hemodynamic parameters for last 24 hours       Respiratory Information for the last 24 hours       Physical Exam   Physical Exam   Constitutional: She is oriented to person, place, and time.   HENT:   Head: Normocephalic and atraumatic.   Right Ear: External ear normal.   Left Ear: External ear normal.   Nose: Nose normal.   Mouth/Throat: Oropharynx is clear and moist.   Eyes: Pupils are equal, round, and reactive to light. Conjunctivae are normal. Right eye exhibits no discharge. Left eye exhibits  no discharge.   Neck: Normal range of motion. Neck supple. No JVD present. No tracheal deviation present.   Cardiovascular: Intact distal pulses.  Exam reveals no gallop.    No murmur heard.  Sinus rhythm   Pulmonary/Chest: No stridor. She has no wheezes. She has no rales.   Abdominal: Soft. Bowel sounds are normal. She exhibits no distension. There is no tenderness. There is no rebound.   Musculoskeletal: Normal range of motion. She exhibits no edema or tenderness.   No clubbing or cyanosis   Neurological: She is alert and oriented to person, place, and time. No cranial nerve deficit. Coordination normal.   No focal weakness   Skin: Skin is warm and dry. No rash noted. She is not diaphoretic. No erythema.       Medications  Current Facility-Administered Medications   Medication Dose Route Frequency Provider Last Rate Last Dose   • cefTRIAXone (ROCEPHIN) 2 g in  mL IVPB  2 g Intravenous Q24HRS Lety Garcia M.D.       • Respiratory Care per Protocol   Nebulization Continuous RT Lety Garcia M.D.       • enoxaparin (LOVENOX) inj 40 mg  40 mg Subcutaneous DAILY Lety Garcia M.D.   40 mg at 03/09/19 1731   • ondansetron (ZOFRAN) syringe/vial injection 4 mg  4 mg Intravenous Q4HRS PRN Lety Garcia M.D.       • ondansetron (ZOFRAN ODT) dispertab 4 mg  4 mg Oral Q4HRS PRN Lety Garcia M.D.       • promethazine (PHENERGAN) tablet 12.5-25 mg  12.5-25 mg Oral Q4HRS PRBHARATH Garcia M.D.       • promethazine (PHENERGAN) suppository 12.5-25 mg  12.5-25 mg Rectal Q4HRS PRN Lety Garcia M.D.       • prochlorperazine (COMPAZINE) injection 5-10 mg  5-10 mg Intravenous Q4HRS PRBHARATH Garcia M.D.       • oseltamivir (TAMIFLU) capsule 75 mg  75 mg Oral Q12HRS Lety Garcia M.D.   75 mg at 03/09/19 2028   • Influenza Vaccine Quad pf injection 0.5 mL  0.5 mL Intramuscular Once PRN Jani Martinez M.D.       • pneumococcal vaccine (PNEUMOVAX-23) injection 25 mcg  0.5 mL  Intramuscular Once PRN Jani Martinez M.D.       • ipratropium-albuterol (DUONEB) nebulizer solution  3 mL Nebulization Q4H PRN (RT) Jani Martinez M.D.       • norepinephrine (LEVOPHED) 8 mg in  mL Infusion  0-30 mcg/min Intravenous Continuous Jani Martinez M.D. 2.8 mL/hr at 03/10/19 0441 1.5 mcg/min at 03/10/19 0441   • ipratropium-albuterol (DUONEB) nebulizer solution  3 mL Nebulization 4X/DAY (RT) Jani Martinez M.D.   3 mL at 03/09/19 1915   • 0.9 % NaCl with KCl 20 mEq infusion   Intravenous Continuous Jani Martinez M.D. 150 mL/hr at 03/10/19 0408         Fluids    Intake/Output Summary (Last 24 hours) at 03/10/19 0457  Last data filed at 03/10/19 0400   Gross per 24 hour   Intake          6525.29 ml   Output             1150 ml   Net          5375.29 ml       Laboratory          Recent Labs      03/09/19   1208   SODIUM  133*   POTASSIUM  3.6   CHLORIDE  101   CO2  20   BUN  11   CREATININE  0.65   CALCIUM  9.8     Recent Labs      03/09/19   1208   ALTSGPT  25   ASTSGOT  21   ALKPHOSPHAT  71   TBILIRUBIN  0.6   GLUCOSE  126*     Recent Labs      03/09/19   1208   WBC  21.0*   NEUTSPOLYS  95.30*   LYMPHOCYTES  1.70*   MONOCYTES  0.60   EOSINOPHILS  0.20   BASOPHILS  0.50   ASTSGOT  21   ALTSGPT  25   ALKPHOSPHAT  71   TBILIRUBIN  0.6     Recent Labs      03/09/19   1208   RBC  5.36   HEMOGLOBIN  16.8*   HEMATOCRIT  49.3*   PLATELETCT  315       Imaging  X-Ray:  I have personally reviewed the images and compared with prior images. and My impression is: Clear lungs  CT:    Reviewed    Assessment/Plan  * Septic shock (HCC)   Assessment & Plan    Due to influenza A  Continue IV fluids  Continue antibiotics  Titrating norepinephrine to keep mean arterial pressure greater than 65     Acute maxillary sinusitis   Assessment & Plan    CT imaging reveals acute maxillary sinusitis  Continue Rocephin     Influenza A   Assessment & Plan    Continue Tamiflu     Mild  persistent asthma without complication- (present on admission)   Assessment & Plan    No acute exacerbation  RT protocols     Hypophosphatemia   Assessment & Plan    Replete phosphorus     Hypomagnesemia   Assessment & Plan    Replete magnesium     Hypokalemia- (present on admission)   Assessment & Plan    Replete potassium     Other hyperlipidemia   Assessment & Plan    History of     Celiac disease- (present on admission)   Assessment & Plan    History of          VTE:  Lovenox  Ulcer: Not Indicated  Lines: Central Line  Ongoing indication addressed    I have performed a physical exam and reviewed and updated ROS and Plan today (3/10/2019). In review of yesterday's note (3/9/2019), there are no changes except as documented above.     This lady is critically ill in ICU with septic shock requiring titration of vasopressor support.  I have assessed and reassessed her respiratory status, blood pressure, hemodynamics, cardiovascular status with titration of norepinephrine.  She is at increased risk for worsening respiratory and cardiovascular system dysfunction.    Discussed patient condition and risk of morbidity and/or mortality with Hospitalist, RN, RT, Pharmacy, Charge nurse / hot rounds and QA team     The patient remains critically ill.  Critical care time = 32 minutes in directly providing and coordinating critical care and extensive data review.  No time overlap and excludes procedures.    Jani Martinez MD  Pulmonary and Critical Care Medicine

## 2019-03-10 NOTE — ED NOTES
Med rec completed per pt at bedside  Allergies reviewed  Verified recent ABX treatment with [facility] pharmacy  Pt states she finished first course of ABX yesterday (5 day course), Macrobid, and has taken 2 doses of Augmentin so far (total of 7 day course)

## 2019-03-10 NOTE — ASSESSMENT & PLAN NOTE
Due to influenza A, monitor for secondary complications  IV fluids resuscitation complete  Complete antibiotic regimen, close to being able to convert to oral therapy  Weaned off norepinephrine 3/10, hemodynamics markedly improved

## 2019-03-10 NOTE — ASSESSMENT & PLAN NOTE
No acute exacerbation  Nebulized treatments as needed  Incentive spirometry  Mobilize  RT protocols

## 2019-03-10 NOTE — PROGRESS NOTES
Patient arrived on unit at 1709 from Emergency Department. Patient was alert and oriented times three, however, lethargic. While general admission documentation was completed the patient continued to become more hypotensive. Dr. Martinez was notified of new admission and progressing hypotension. New orders were received and implemented. Patient's family members were notified of admission via phone call.

## 2019-03-10 NOTE — ASSESSMENT & PLAN NOTE
CT imaging reveals acute maxillary sinusitis  Continue Rocephin, may be appropriate to switch to oral antibiotics, follow-up with primary care physician as an outpatient to determine duration of therapy, occasionally may need prolonged course  Decongestants as needed  Mucolytic's as needed

## 2019-03-10 NOTE — ASSESSMENT & PLAN NOTE
Continue Tamiflu, 5-day course  Encourage patient to converse with family and have them seek out care from their primary care physicians if they have similar symptoms  Clinically improved

## 2019-03-10 NOTE — PROGRESS NOTES
Report received and care assumed of pt. Reviewed orders with pt and questions answered. Levophed gtt to maintain MAP>65, will wean as tolerated and d/c if able. Orders received from Dr. Martinez to adv diet as jorge, d/c lactic acid 4hr.

## 2019-03-10 NOTE — ASSESSMENT & PLAN NOTE
This is severe sepsis with the following associated acute organ dysfunction(s): metabolic/septic encephalopathy.   Secondary to influenza A  Off levophed; s/p copious fluids  Continue with Tamiflu and Rocephin

## 2019-03-10 NOTE — ED NOTES
Bedside report given to JUVENTINO Goodson from ICU. RN updated on pt's isolation status, current POC, and trends of labs and vital signs. Pt transported via gurney with RN and tech on cardiac monitor and O2.

## 2019-03-10 NOTE — CARE PLAN
Problem: Psychosocial Needs:  Goal: Level of anxiety will decrease    Intervention: Identify and develop with patient strategies to cope with anxiety triggers  Pt anxious ie diagnosis and admission. Daughter and parents live out of state, son lives with pt. Reassurance and emotional support given. 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

## 2019-03-10 NOTE — PROCEDURES
Date of service:  3/9/2019    Title:  Central venous catheter placement - internal jugular vein    Indication: Septic shock.  Needs central venous access for vasopressor administration.    Narrative:    The right neck was prepped with chlorhexidine and draped in the usual sterile fashion.  1% Xylocaine solution was used for topical anesthesia.  A triple lumen central venous catheter was placed into the right internal jugular vein under ultrasound guidance using the technique described by Checo without difficulty or apparent complication.  The line was sutured into place and a sterile dressing was placed over the line.  All 3 ports flush and return venous blood easily.  The patient tolerated the procedure quite nicely.  No complications are apparent.  A STAT CXR is ordered to confirm placement.      Jani Martinez MD  Pulmonary and Critical Care Medicine

## 2019-03-10 NOTE — CONSULTS
PULMONARY AND CRITICAL CARE MEDICINE CONSULTATION    Date of Consultation:  3/9/2019    Requesting Physician:  Lety Garcia MD    Consulting Physician:  Jani Martinez MD    Reason for Consultation: Critical care management in lady with septic shock    Chief Complaint: Flulike symptoms with fevers, myalgias, arthralgias and sore throat    History of Present Illness:    I was kindly asked to see and evaluate Britt Subramanian, a 48 y.o. female for evaluation and management of the above problem.    This lady has a history of dyslipidemia, asthma, celiac disease and ADHD.  For the last week she has been ill.  Her illness began with a sore throat.  She has had fever, generalized malaise, myalgias and arthralgias.  She developed diarrhea yesterday.  She denies melena or hematochezia.  She does not have abdominal pain.  She has a cough with green sputum production.  She has some blood-tinged sputum.  She has been feeling short of breath.  She was seen by her primary care physician and prescribed amoxicillin, however her symptoms have continued to get worse.  She presented to the emergency room and she has received the appropriate amount of IV crystalloid solution, 30 mL/kg.  She has remained hypotensive despite volume resuscitation.    Medications Prior to Admission:    No current facility-administered medications on file prior to encounter.      Current Outpatient Prescriptions on File Prior to Encounter   Medication Sig Dispense Refill   • metFORMIN ER (GLUCOPHAGE XR) 500 MG TABLET SR 24 HR Take 1 Tab by mouth every day for 90 days. 90 Tab 2       Current Medications:      Current Facility-Administered Medications:   •  [START ON 3/10/2019] cefTRIAXone (ROCEPHIN) 2 g in  mL IVPB, 2 g, Intravenous, Q24HRS, Lety Garcia M.D.  •  Respiratory Care per Protocol, , Nebulization, Continuous RT, Lety Garcia M.D.  •  enoxaparin (LOVENOX) inj 40 mg, 40 mg, Subcutaneous, DAILY, Lety Garcia  M.D., 40 mg at 03/09/19 1731  •  acetaminophen (TYLENOL) tablet 650 mg, 650 mg, Oral, Q6HRS PRN, Lety Garcia M.D.  •  ondansetron (ZOFRAN) syringe/vial injection 4 mg, 4 mg, Intravenous, Q4HRS PRN, Lety Garcia M.D.  •  ondansetron (ZOFRAN ODT) dispertab 4 mg, 4 mg, Oral, Q4HRS PRN, Lety Garcia M.D.  •  promethazine (PHENERGAN) tablet 12.5-25 mg, 12.5-25 mg, Oral, Q4HRS PRN, Lety Garcia M.D.  •  promethazine (PHENERGAN) suppository 12.5-25 mg, 12.5-25 mg, Rectal, Q4HRS PRN, Lety Garcia M.D.  •  prochlorperazine (COMPAZINE) injection 5-10 mg, 5-10 mg, Intravenous, Q4HRS PRN, Lety Garcia M.D.  •  oseltamivir (TAMIFLU) capsule 75 mg, 75 mg, Oral, Q12HRS, Lety Garcia M.D.  •  Influenza Vaccine Quad pf injection 0.5 mL, 0.5 mL, Intramuscular, Once PRN, Jani Martinez M.D.  •  pneumococcal vaccine (PNEUMOVAX-23) injection 25 mcg, 0.5 mL, Intramuscular, Once PRN, Jani Martinez M.D.  •  ipratropium-albuterol (DUONEB) nebulizer solution, 3 mL, Nebulization, Q4H PRN (RT), Jani Martinez M.D.  •  norepinephrine (LEVOPHED) 8 mg in  mL Infusion, 0-30 mcg/min, Intravenous, Continuous, Jani Martinez M.D.  •  ipratropium-albuterol (DUONEB) nebulizer solution, 3 mL, Nebulization, 4X/DAY (RT), Jani Martinez M.D., 3 mL at 03/09/19 1915  •  MIDAZOLAM HCL 5 MG/5ML INJ SOLN, , , ,   •  midazolam (VERSED) injection 1-5 mg, 1-5 mg, Intravenous, Once, Jani Martinez M.D.  •  0.9 % NaCl with KCl 20 mEq infusion, , Intravenous, Continuous, Jani Martinez M.D.    Allergies:    Gluten meal and Tylenol    Past Surgical History:    Past Surgical History:   Procedure Laterality Date   • HYSTERECTOMY, VAGINAL  2010    heavy menses   • PB ENLARGE BREAST WITH IMPLANT  2009   • PB REDUCTION OF LARGE BREAST  2009   • APPENDECTOMY  1999       Past Medical History:    Past Medical History:   Diagnosis Date   • Celiac disease    • Celiac  disease 6/22/2018    Diagnosed by GI in [place] by official testing and endoscopy. Doing well with gluten free diet.   • History of insulin resistance 2012   • Mild persistent asthma without complication 6/22/2018    Exercise induced asthma. Only with strenuous exercise. PRN Albuterol inhaler.    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    • Vitamin D deficiency 6/22/2018    Taking OTC.        Social History:    Social History     Social History   • Marital status:      Spouse name: N/A   • Number of children: 3   • Years of education: N/A     Occupational History   • [employer]     Social History Main Topics   • Smoking status: Never Smoker   • Smokeless tobacco: Never Used   • Alcohol use No   • Drug use: No   • Sexual activity: Yes     Partners: Male     Other Topics Concern   • Not on file     Social History Narrative   • No narrative on file       Family History:    Family History   Problem Relation Age of Onset   • Other Mother         DVTs, IVC filter in place   • Diabetes Father    • Other Father         gout   • No Known Problems Sister    • Diabetes Brother    • Cancer Maternal Grandmother         skin cancer   • Breast Cancer Maternal Aunt        Review of System:    Review of Systems   Constitutional: Positive for chills, fever and malaise/fatigue.   HENT: Positive for congestion. Negative for ear pain and nosebleeds.    Eyes: Negative for blurred vision, double vision and photophobia.   Respiratory: Positive for cough, sputum production and shortness of breath. Negative for stridor.    Cardiovascular: Negative for chest pain, palpitations, claudication and leg swelling.   Gastrointestinal: Positive for diarrhea. Negative for abdominal pain, nausea and vomiting.   Genitourinary: Negative for dysuria, hematuria and urgency.   Musculoskeletal: Positive for myalgias. Negative for back pain and neck  "pain.   Skin: Negative for rash.   Neurological: Positive for dizziness and weakness. Negative for speech change, focal weakness and seizures.   Endo/Heme/Allergies: Does not bruise/bleed easily.   Psychiatric/Behavioral: Negative for hallucinations, substance abuse and suicidal ideas. The patient is not nervous/anxious.        Physical Examination:    /87   Pulse 78   Temp 37.1 °C (98.7 °F) (Temporal)   Resp (!) 25   Ht 1.752 m (5' 8.98\")   Wt 68.2 kg (150 lb 5.7 oz)   SpO2 95%   BMI 22.22 kg/m²   Physical Exam   Constitutional: She is oriented to person, place, and time.   HENT:   Head: Normocephalic and atraumatic.   Right Ear: External ear normal.   Left Ear: External ear normal.   Nose: Nose normal.   Dry mucous membranes   Eyes: Pupils are equal, round, and reactive to light. Conjunctivae are normal. Right eye exhibits no discharge. Left eye exhibits no discharge.   Neck: Normal range of motion. Neck supple. No JVD present. No tracheal deviation present.   Cardiovascular: Intact distal pulses.  Exam reveals no friction rub.    No murmur heard.  Sinus tachycardia   Pulmonary/Chest: No stridor. She has no wheezes. She has no rales.   Abdominal: Soft. Bowel sounds are normal. She exhibits no distension. There is no tenderness. There is no rebound.   Musculoskeletal: She exhibits no edema or tenderness.   No clubbing or cyanosis   Neurological: She is alert and oriented to person, place, and time. No cranial nerve deficit. Coordination normal. GCS score is 15.   No focal weakness   Skin: Skin is dry. No rash noted. She is not diaphoretic. No erythema.       Laboratory Data:        Recent Labs      03/09/19   1208   WBC  21.0*   RBC  5.36   HEMOGLOBIN  16.8*   HEMATOCRIT  49.3*   MCV  92.0   MCH  31.3   MCHC  34.1   RDW  42.7   PLATELETCT  315   MPV  9.5     Recent Labs      03/09/19   1208   SODIUM  133*   POTASSIUM  3.6   CHLORIDE  101   CO2  20   GLUCOSE  126*   BUN  11   CREATININE  0.65   CALCIUM  " 9.8                   Imaging:    I personally viewed the CXR and CT scan images as well as reviewed the radiology interpretation reports.    CT-MAXILLOFACIAL W/O PLUS RECONS   Final Result         Paranasal sinus disease as described. Air-fluid levels in the maxillary sinuses suggests the possibility of acute sinusitis      CT-HEAD W/O   Final Result      1.  No acute intracranial findings.      2.  Ethmoid and maxillary sinus disease. There are phleboliths in the maxillary sinuses suggest possible acute sinusitis         DX-CHEST-PORTABLE (1 VIEW)   Final Result      1.  There is no acute cardiopulmonary process.      DX-CHEST-LIMITED (1 VIEW)    (Results Pending)       Assessment and Plan:    * Septic shock (HCC)   Assessment & Plan    Due to influenza A  She has received the appropriate amount of intravenous crystalloid solution  Continue hydration with IV fluids  Culture blood, sputum and urine  Trend lactic acid  Start empiric antibiotics  Titrating norepinephrine to maintain mean arterial pressure greater than 65     Acute maxillary sinusitis   Assessment & Plan    CT imaging reveals acute maxillary sinusitis  Start Rocephin     Influenza A   Assessment & Plan    Start Tamiflu     Mild persistent asthma without complication- (present on admission)   Assessment & Plan    No acute exacerbation  RT protocols     Hypokalemia   Assessment & Plan    Replete potassium     Other hyperlipidemia- (present on admission)   Assessment & Plan    History of     Celiac disease- (present on admission)   Assessment & Plan    History of       This lady is critically ill in the ICU with septic shock.  She has influenza A and acute maxillary sinusitis.  I have assessed and reassessed her respiratory status, blood pressure, hemodynamics, cardiovascular status with titration of norepinephrine and neurologic status.  She is at increased risk for worsening respiratory and cardiovascular system dysfunction.    High risk of  deterioration and worsening vital organ dysfunction and death without the above critical care interventions.    Thank you for allowing me to participate in the care of this lady.  I will continue to follow her with great interest.    Critical Care Time: 35 minutes  79086  No time overlap  Time excludes procedures  Discussed with RN, RT, hospitalist    Jani Martinez MD  Pulmonary and Critical Care Medicine

## 2019-03-10 NOTE — PROGRESS NOTES
Hospital Medicine Daily Progress Note    Date of Service  3/10/2019    Chief Complaint  48 y.o. female admitted 3/9/2019 with generalized weakness, myalgias and arthralgias, fever and chills    Hospital Course    48 y.o. female with past medical history of celiac disease, will controlled type 2 diabetes mellitus on metformin comes in due to generalized weakness, arthralgia, myalgias, fever and chills.  According to the patient, she went to her PCP a few days ago with similar symptoms.  She was given a prescription for amoxicillin.  There was also questionable UTI and was given a script for Bactrim as she stated.  She took the antibiotics but with no improvement.  On the day of admission she was dizzy and lethargic, confused and disoriented.  She came in and was found to be hypotensive and septic.  Influenza screening was positive for influenza A.  Was started on aggressive resuscitation with IV fluids.  Received adequate amounts of IV fluids per sepsis protocol however, blood pressure continues to be low      Interval Problem Update  Titrated levo off since 830  Still feels bad  Afeb  SR  NSwith 20K 150; A&Ox4  BP 90s  2L NC down from 4L, doesn't use at home  TLC RIJ, PIVs  Hb dropped  Duonebs for asthma  Tamiflu  C3 for sinusitis? Was recently started on augmentin      Consultants/Specialty  Intensivist    Code Status  FULL    Disposition  Transfer later today if remains off pressors, isolation    Review of Systems  Review of Systems   Constitutional: Negative for chills and fever.   Eyes: Negative for blurred vision.   Respiratory: Positive for cough.    Cardiovascular: Negative for chest pain and palpitations.   Gastrointestinal: Negative for abdominal pain, nausea and vomiting.   Genitourinary: Negative for dysuria.   Musculoskeletal: Positive for myalgias.   Neurological: Negative for dizziness and headaches.   All other systems reviewed and are negative.       Physical Exam  Temp:  [37.1 °C (98.7 °F)-39.2 °C  (102.6 °F)] 37.3 °C (99.2 °F)  Pulse:  [] 99  Resp:  [15-64] 22  BP: (135)/(87) 135/87  SpO2:  [87 %-99 %] 98 %    Physical Exam   Constitutional: She is oriented to person, place, and time. She appears well-developed and well-nourished.   HENT:   Head: Normocephalic and atraumatic.   Cardiovascular: Normal rate, regular rhythm and normal heart sounds.    No murmur heard.  Pulmonary/Chest: Effort normal and breath sounds normal. No respiratory distress. She has no wheezes. She has no rales (rll).   2L    Abdominal: Soft. Bowel sounds are normal. She exhibits no distension. There is no tenderness.   Musculoskeletal: Normal range of motion. She exhibits no edema.   Neurological: She is alert and oriented to person, place, and time.   Skin: Skin is warm and dry. No erythema.   Nursing note and vitals reviewed.      Fluids    Intake/Output Summary (Last 24 hours) at 03/10/19 0831  Last data filed at 03/10/19 0700   Gross per 24 hour   Intake          6984.37 ml   Output             1150 ml   Net          5834.37 ml       Laboratory  Recent Labs      03/09/19   1208  03/10/19   0430   WBC  21.0*  12.0*   RBC  5.36  3.89*   HEMOGLOBIN  16.8*  12.3   HEMATOCRIT  49.3*  36.8*   MCV  92.0  94.6   MCH  31.3  31.6   MCHC  34.1  33.4*   RDW  42.7  45.8   PLATELETCT  315  244   MPV  9.5  10.4     Recent Labs      03/09/19   1208  03/10/19   0430   SODIUM  133*  138   POTASSIUM  3.6  3.9   CHLORIDE  101  113*   CO2  20  19*   GLUCOSE  126*  104*   BUN  11  6*   CREATININE  0.65  0.51   CALCIUM  9.8  7.1*     Recent Labs      03/10/19   0430   APTT  39.0*   INR  1.50*               Imaging  DX-CHEST-LIMITED (1 VIEW)   Final Result      1.  Interval placement of right IJ catheter with the tip projecting over the right atrium.   2.  There are changes consistent with mild vascular congestion/edema.      CT-MAXILLOFACIAL W/O PLUS RECONS   Final Result         Paranasal sinus disease as described. Air-fluid levels in the  maxillary sinuses suggests the possibility of acute sinusitis      CT-HEAD W/O   Final Result      1.  No acute intracranial findings.      2.  Ethmoid and maxillary sinus disease. There are phleboliths in the maxillary sinuses suggest possible acute sinusitis         DX-CHEST-PORTABLE (1 VIEW)   Final Result      1.  There is no acute cardiopulmonary process.           Assessment/Plan  * Septic shock (HCC)- (present on admission)   Assessment & Plan    This is severe sepsis with the following associated acute organ dysfunction(s): metabolic/septic encephalopathy.   Secondary to influenza A  Off levophed; s/p copious fluids  Continue with Tamiflu and Rocephin       Acute maxillary sinusitis- (present on admission)   Assessment & Plan    Covering with IV Rocephin     Influenza A- (present on admission)   Assessment & Plan    On Tamiflu 5 days  Covering with C3 as well for any bacterial pna possibility     Mild persistent asthma without complication- (present on admission)   Assessment & Plan    RT protocol     Type 2 diabetes mellitus (HCC)- (present on admission)   Assessment & Plan    Hemoglobin A1c 5.7  Well-controlled       Polycythemia- (present on admission)   Assessment & Plan    Likely 2/2 dehydration     Hyponatremia- (present on admission)   Assessment & Plan    Resolved with fluids     Hypophosphatemia- (present on admission)   Assessment & Plan    Replaced      Hypomagnesemia- (present on admission)   Assessment & Plan    replaced     History of attention deficit disorder- (present on admission)   Assessment & Plan    On Vyvanse     Hypokalemia- (present on admission)   Assessment & Plan    replaced     Celiac disease- (present on admission)   Assessment & Plan    History   Gluten-free diet          VTE prophylaxis: sq lovenox

## 2019-03-11 VITALS
TEMPERATURE: 98.2 F | OXYGEN SATURATION: 92 % | SYSTOLIC BLOOD PRESSURE: 135 MMHG | HEIGHT: 69 IN | HEART RATE: 84 BPM | RESPIRATION RATE: 28 BRPM | WEIGHT: 150.35 LBS | BODY MASS INDEX: 22.27 KG/M2 | DIASTOLIC BLOOD PRESSURE: 87 MMHG

## 2019-03-11 PROBLEM — R65.21 SEPTIC SHOCK (HCC): Status: RESOLVED | Noted: 2019-03-09 | Resolved: 2019-03-11

## 2019-03-11 PROBLEM — E83.39 HYPOPHOSPHATEMIA: Status: RESOLVED | Noted: 2019-03-10 | Resolved: 2019-03-11

## 2019-03-11 PROBLEM — D75.1 POLYCYTHEMIA: Status: RESOLVED | Noted: 2019-03-09 | Resolved: 2019-03-11

## 2019-03-11 PROBLEM — E87.1 HYPONATREMIA: Status: RESOLVED | Noted: 2019-03-09 | Resolved: 2019-03-11

## 2019-03-11 PROBLEM — E83.42 HYPOMAGNESEMIA: Status: RESOLVED | Noted: 2019-03-10 | Resolved: 2019-03-11

## 2019-03-11 PROBLEM — E87.6 HYPOKALEMIA: Status: RESOLVED | Noted: 2019-03-09 | Resolved: 2019-03-11

## 2019-03-11 PROBLEM — A41.9 SEPTIC SHOCK (HCC): Status: RESOLVED | Noted: 2019-03-09 | Resolved: 2019-03-11

## 2019-03-11 LAB
ANION GAP SERPL CALC-SCNC: 5 MMOL/L (ref 0–11.9)
BASOPHILS # BLD AUTO: 0.5 % (ref 0–1.8)
BASOPHILS # BLD: 0.03 K/UL (ref 0–0.12)
BUN SERPL-MCNC: 5 MG/DL (ref 8–22)
CALCIUM SERPL-MCNC: 8.1 MG/DL (ref 8.5–10.5)
CHLORIDE SERPL-SCNC: 113 MMOL/L (ref 96–112)
CO2 SERPL-SCNC: 22 MMOL/L (ref 20–33)
CREAT SERPL-MCNC: 0.52 MG/DL (ref 0.5–1.4)
EOSINOPHIL # BLD AUTO: 0.39 K/UL (ref 0–0.51)
EOSINOPHIL NFR BLD: 6.8 % (ref 0–6.9)
ERYTHROCYTE [DISTWIDTH] IN BLOOD BY AUTOMATED COUNT: 48.6 FL (ref 35.9–50)
GLUCOSE SERPL-MCNC: 103 MG/DL (ref 65–99)
HCT VFR BLD AUTO: 35.8 % (ref 37–47)
HGB BLD-MCNC: 11.7 G/DL (ref 12–16)
IMM GRANULOCYTES # BLD AUTO: 0.04 K/UL (ref 0–0.11)
IMM GRANULOCYTES NFR BLD AUTO: 0.7 % (ref 0–0.9)
LYMPHOCYTES # BLD AUTO: 1.49 K/UL (ref 1–4.8)
LYMPHOCYTES NFR BLD: 26.1 % (ref 22–41)
MCH RBC QN AUTO: 31.2 PG (ref 27–33)
MCHC RBC AUTO-ENTMCNC: 32.7 G/DL (ref 33.6–35)
MCV RBC AUTO: 95.5 FL (ref 81.4–97.8)
MONOCYTES # BLD AUTO: 0.33 K/UL (ref 0–0.85)
MONOCYTES NFR BLD AUTO: 5.8 % (ref 0–13.4)
NEUTROPHILS # BLD AUTO: 3.43 K/UL (ref 2–7.15)
NEUTROPHILS NFR BLD: 60.1 % (ref 44–72)
NRBC # BLD AUTO: 0 K/UL
NRBC BLD-RTO: 0 /100 WBC
PLATELET # BLD AUTO: 211 K/UL (ref 164–446)
PMV BLD AUTO: 10.5 FL (ref 9–12.9)
POTASSIUM SERPL-SCNC: 4.1 MMOL/L (ref 3.6–5.5)
RBC # BLD AUTO: 3.75 M/UL (ref 4.2–5.4)
SODIUM SERPL-SCNC: 140 MMOL/L (ref 135–145)
WBC # BLD AUTO: 5.7 K/UL (ref 4.8–10.8)

## 2019-03-11 PROCEDURE — 700101 HCHG RX REV CODE 250: Performed by: INTERNAL MEDICINE

## 2019-03-11 PROCEDURE — 700111 HCHG RX REV CODE 636 W/ 250 OVERRIDE (IP): Performed by: FAMILY MEDICINE

## 2019-03-11 PROCEDURE — 700102 HCHG RX REV CODE 250 W/ 637 OVERRIDE(OP): Performed by: FAMILY MEDICINE

## 2019-03-11 PROCEDURE — 99239 HOSP IP/OBS DSCHRG MGMT >30: CPT | Performed by: HOSPITALIST

## 2019-03-11 PROCEDURE — 80048 BASIC METABOLIC PNL TOTAL CA: CPT

## 2019-03-11 PROCEDURE — A9270 NON-COVERED ITEM OR SERVICE: HCPCS | Performed by: FAMILY MEDICINE

## 2019-03-11 PROCEDURE — 85025 COMPLETE CBC W/AUTO DIFF WBC: CPT

## 2019-03-11 PROCEDURE — 99232 SBSQ HOSP IP/OBS MODERATE 35: CPT | Performed by: INTERNAL MEDICINE

## 2019-03-11 RX ORDER — OSELTAMIVIR PHOSPHATE 75 MG/1
75 CAPSULE ORAL EVERY 12 HOURS
Qty: 5 CAP | Refills: 0 | Status: SHIPPED | OUTPATIENT
Start: 2019-03-11 | End: 2019-03-14

## 2019-03-11 RX ORDER — SULFAMETHOXAZOLE AND TRIMETHOPRIM 800; 160 MG/1; MG/1
1 TABLET ORAL 2 TIMES DAILY
Qty: 6 TAB | Refills: 0 | Status: SHIPPED | OUTPATIENT
Start: 2019-03-12 | End: 2019-03-15

## 2019-03-11 RX ORDER — IBUPROFEN 600 MG/1
600 TABLET ORAL ONCE
Status: DISCONTINUED | OUTPATIENT
Start: 2019-03-11 | End: 2019-03-11 | Stop reason: HOSPADM

## 2019-03-11 RX ADMIN — POTASSIUM CHLORIDE AND SODIUM CHLORIDE: 900; 150 INJECTION, SOLUTION INTRAVENOUS at 04:18

## 2019-03-11 RX ADMIN — ENOXAPARIN SODIUM 40 MG: 100 INJECTION SUBCUTANEOUS at 06:27

## 2019-03-11 RX ADMIN — OSELTAMIVIR PHOSPHATE 75 MG: 75 CAPSULE ORAL at 06:27

## 2019-03-11 ASSESSMENT — ENCOUNTER SYMPTOMS
SEIZURES: 0
BRUISES/BLEEDS EASILY: 0
PALPITATIONS: 0
STRIDOR: 0
HEADACHES: 0
VOMITING: 0
BACK PAIN: 0
COUGH: 1
ABDOMINAL PAIN: 0
CHILLS: 0
NAUSEA: 0
CLAUDICATION: 0
FOCAL WEAKNESS: 0
NERVOUS/ANXIOUS: 0
FEVER: 0
SPEECH CHANGE: 0
ORTHOPNEA: 0
DIZZINESS: 0
BLURRED VISION: 0
DOUBLE VISION: 0
HALLUCINATIONS: 0
BLOOD IN STOOL: 0
PHOTOPHOBIA: 0
MYALGIAS: 1

## 2019-03-11 NOTE — PROGRESS NOTES
Critical Care Progress Note    Date of admission  3/9/2019    Chief Complaint  48 y.o. female admitted 3/9/2019 with flulike symptoms.    Hospital Course    This lady was admitted to the ICU with septic shock.      Interval Problem Update  Reviewed last 24 hour events:    A&O x4  Follows  Weaned off NE  SR 70s  SBp 100-110s  I/Os neg 680cc/24hrs  Weaned to RA  Tm 99.7  Room air  Lovenox  Ceftriaxone/Tamiflu day 2    Bowel care protocols  Heplock IVF  Mobilize  Okay to transfer out of ICU     YESTERDAY  NE titrating  SR  NS with 20 K  Oriented x 4  2 L NC  Replete Mg and PO4      Review of Systems  Review of Systems   Constitutional: Positive for malaise/fatigue (Improved). Negative for chills and fever.   HENT: Negative for ear discharge and nosebleeds.    Eyes: Negative for blurred vision, double vision and photophobia.   Respiratory: Positive for cough (Nonproductive no). Negative for sputum production, shortness of breath and stridor.    Cardiovascular: Negative for chest pain, orthopnea and claudication.   Gastrointestinal: Positive for nausea (Improved). Negative for abdominal pain, blood in stool and vomiting.   Genitourinary: Negative for dysuria, hematuria and urgency.   Musculoskeletal: Positive for myalgias (Improved). Negative for back pain.   Skin: Negative for rash.   Neurological: Positive for headaches (New). Negative for dizziness, speech change, focal weakness, seizures and weakness.   Endo/Heme/Allergies: Does not bruise/bleed easily.   Psychiatric/Behavioral: Negative for hallucinations and suicidal ideas. The patient is not nervous/anxious.    Repeat ROS performed, see above changes     Vital Signs for last 24 hours   Temp:  [36.8 °C (98.2 °F)-37.1 °C (98.7 °F)] 36.8 °C (98.2 °F)  Pulse:  [66-84] 84  Resp:  [27-28] 28  SpO2:  [92 %-99 %] 92 %    Hemodynamic parameters for last 24 hours       Respiratory Information for the last 24 hours       Physical Exam   Physical Exam   Constitutional: She is  oriented to person, place, and time. She appears well-nourished. She is cooperative.  Non-toxic appearance. No distress.   HENT:   Head: Normocephalic and atraumatic.   Right Ear: External ear normal.   Left Ear: External ear normal.   Nose: Nose normal.   Mouth/Throat: Oropharynx is clear and moist. Mucous membranes are not dry and not cyanotic.   Eyes: Pupils are equal, round, and reactive to light. Conjunctivae are normal. Right eye exhibits no discharge. Left eye exhibits no discharge. No scleral icterus.   Neck: Normal range of motion and phonation normal. Neck supple. No JVD present. No tracheal deviation present. No thyromegaly present.   Cardiovascular: Normal rate, regular rhythm and intact distal pulses.   No extrasystoles are present. Exam reveals no gallop and no friction rub.    No murmur heard.  Sinus rhythm   Pulmonary/Chest: No accessory muscle usage or stridor. No respiratory distress. She has no wheezes. She has no rhonchi. She has no rales.   Abdominal: Soft. Bowel sounds are normal. She exhibits no distension. There is no hepatosplenomegaly. There is no tenderness. There is no rebound, no guarding and no CVA tenderness.   Musculoskeletal: Normal range of motion. She exhibits no edema or tenderness.   Lymphadenopathy:     She has no cervical adenopathy.   Neurological: She is alert and oriented to person, place, and time. No cranial nerve deficit or sensory deficit. She exhibits normal muscle tone. Coordination normal. GCS eye subscore is 4. GCS verbal subscore is 5. GCS motor subscore is 6.   Skin: Skin is warm and dry. No rash noted. She is not diaphoretic. No cyanosis or erythema. Nails show no clubbing.   Psychiatric: She has a normal mood and affect. Her speech is normal and behavior is normal. Judgment and thought content normal. Cognition and memory are normal.   Vitals reviewed.  Repeat PE performed, see above changes    Medications  No current facility-administered medications for this  encounter.      Current Outpatient Prescriptions   Medication Sig Dispense Refill   • oseltamivir (TAMIFLU) 75 MG Cap Take 1 Cap by mouth every 12 hours for 5 doses. 5 Cap 0   • sulfamethoxazole-trimethoprim (BACTRIM DS) 800-160 MG tablet Take 1 Tab by mouth 2 times a day for 3 days. 6 Tab 0   • nitrofurantoin monohydr macro (MACROBID) 100 MG Cap Take 100 mg by mouth 2 times a day.     • amoxicillin-clavulanate (AUGMENTIN) 875-125 MG Tab Take 1 Tab by mouth 2 times a day.     • lisdexamfetamine (VYVANSE) 70 MG capsule Take 70 mg by mouth every morning.     • DM-Phenylephrine-Acetaminophen (THERAFLU SEVERE COLD/CGH DAY) 10-5-325 MG Tab Take 1-2 Tabs by mouth 2 times a day as needed (cold/flu symptoms).     • Phenylephrine-DM-GG-APAP (THERAFLU EXPRESSMAX SEV CLD/FL) 5--325 MG/15ML Liquid Take 15-30 mL by mouth 2 times a day as needed (cold/flu symptoms).     • metFORMIN ER (GLUCOPHAGE XR) 500 MG TABLET SR 24 HR Take 1 Tab by mouth every day for 90 days. 90 Tab 2       Fluids    Intake/Output Summary (Last 24 hours) at 03/12/19 0002  Last data filed at 03/11/19 0900   Gross per 24 hour   Intake             1107 ml   Output              600 ml   Net              507 ml       Laboratory          Recent Labs      03/09/19   1208  03/10/19   0430  03/11/19 0420   SODIUM  133*  138  140   POTASSIUM  3.6  3.9  4.1   CHLORIDE  101  113*  113*   CO2  20  19*  22   BUN  11  6*  5*   CREATININE  0.65  0.51  0.52   MAGNESIUM   --   1.5   --    PHOSPHORUS   --   1.9*   --    CALCIUM  9.8  7.1*  8.1*     Recent Labs      03/09/19   1208  03/10/19   0430  03/11/19   0420   ALTSGPT  25   --    --    ASTSGOT  21   --    --    ALKPHOSPHAT  71   --    --    TBILIRUBIN  0.6   --    --    GLUCOSE  126*  104*  103*     Recent Labs      03/09/19   1208  03/10/19   0430  03/11/19   0420   WBC  21.0*  12.0*  5.7   NEUTSPOLYS  95.30*  86.10*  60.10   LYMPHOCYTES  1.70*  7.40*  26.10   MONOCYTES  0.60  2.40  5.80   EOSINOPHILS  0.20   1.90  6.80   BASOPHILS  0.50  0.40  0.50   ASTSGOT  21   --    --    ALTSGPT  25   --    --    ALKPHOSPHAT  71   --    --    TBILIRUBIN  0.6   --    --      Recent Labs      03/09/19   1208  03/10/19   0430  03/11/19   0420   RBC  5.36  3.89*  3.75*   HEMOGLOBIN  16.8*  12.3  11.7*   HEMATOCRIT  49.3*  36.8*  35.8*   PLATELETCT  315  244  211   PROTHROMBTM   --   18.2*   --    APTT   --   39.0*   --    INR   --   1.50*   --        Imaging  X-Ray:  I have personally reviewed the images and compared with prior images.  EKG:  I have personally reviewed the images and compared with prior images.  CT:    Reviewed    Assessment/Plan  * Septic shock (HCC)- (present on admission)   Assessment & Plan    Due to influenza A, monitor for secondary complications  IV fluids resuscitation complete  Complete antibiotic regimen, close to being able to convert to oral therapy  Weaned off norepinephrine 3/10, hemodynamics markedly improved     Acute maxillary sinusitis- (present on admission)   Assessment & Plan    CT imaging reveals acute maxillary sinusitis  Continue Rocephin, may be appropriate to switch to oral antibiotics, follow-up with primary care physician as an outpatient to determine duration of therapy, occasionally may need prolonged course  Decongestants as needed  Mucolytic's as needed     Influenza A- (present on admission)   Assessment & Plan    Continue Tamiflu, 5-day course  Encourage patient to converse with family and have them seek out care from their primary care physicians if they have similar symptoms  Clinically improved     Mild persistent asthma without complication- (present on admission)   Assessment & Plan    No acute exacerbation  Nebulized treatments as needed  Incentive spirometry  Mobilize  RT protocols     Type 2 diabetes mellitus (HCC)- (present on admission)   Assessment & Plan    SSI as needed  Serial blood sugar  Diabetic education as needed  Resume home regimen as clinically appropriate      Other hyperlipidemia   Assessment & Plan    History of     Celiac disease- (present on admission)   Assessment & Plan    History of  Continue gluten-free diet, dietary consultation as needed  Monitor          VTE:  Lovenox  Ulcer: Not Indicated  Lines: Central Line  Ongoing indication addressed    I have performed a physical exam and reviewed and updated ROS and Plan today (3/12/2019). In review of yesterday's note (3/11/2019), there are no changes except as documented above.     Patient clinically improved significantly.  Will sign off to internal medicine, please call [facility] if we can be of any assistance.    Discussed patient condition and risk of morbidity and/or mortality with Hospitalist, RN, RT, Pharmacy and Charge nurse / hot rounds

## 2019-03-11 NOTE — CARE PLAN
Problem: Respiratory:  Goal: Respiratory status will improve  Outcome: PROGRESSING AS EXPECTED  Patient states that she feels much better today. Remains in the 90% range for saturation on room air.

## 2019-03-11 NOTE — DISCHARGE INSTRUCTIONS
Discharge Instructions    Discharged to home by car with relative. Discharged via wheelchair, hospital escort: Yes.  Special equipment needed: Not Applicable    Be sure to schedule a follow-up appointment with your primary care doctor or any specialists as instructed.     Discharge Plan:   Diet Plan: Discussed  Activity Level: Discussed  Confirmed Follow up Appointment: Patient to Call and Schedule Appointment  Confirmed Symptoms Management: Discussed  Medication Reconciliation Updated: Yes  Influenza Vaccine Indication: Indicated: 9 to 64 years of age    I understand that a diet low in cholesterol, fat, and sodium is recommended for good health. Unless I have been given specific instructions below for another diet, I accept this instruction as my diet prescription.   Other diet: Regular    Special Instructions: Sepsis, Adult  Sepsis is a serious infection of your blood or tissues that affects your whole body. The infection that causes sepsis may be bacterial, viral, fungal, or parasitic. Sepsis may be life threatening. Sepsis can cause your blood pressure to drop. This may result in shock. Shock causes your central nervous system and your organs to stop working correctly.  What increases the risk?  Sepsis can happen in anyone, but it is more likely to happen in people who have weakened immune systems.  What are the signs or symptoms?  Symptoms of sepsis can include:  · Fever or low body temperature (hypothermia).  · Rapid breathing (hyperventilation).  · Chills.  · Rapid heartbeat (tachycardia).  · Confusion or light-headedness.  · Trouble breathing.  · Urinating much less than usual.  · Cool, clammy skin or red, flushed skin.  · Other problems with the heart, kidneys, or brain.  How is this diagnosed?  Your health care provider will likely do tests to look for an infection, to see if the infection has spread to your blood, and to see how serious your condition is. Tests can include:  · Blood tests, including  cultures of your blood.  · Cultures of other fluids from your body, such as:  ¨ Urine.  ¨ Pus from wounds.  ¨ Mucus coughed up from your lungs.  · Urine tests other than cultures.  · X-ray exams or other imaging tests.  How is this treated?  Treatment will begin with elimination of the source of infection. If your sepsis is likely caused by a bacterial or fungal infection, you will be given antibiotic or antifungal medicines.  You may also receive:  · Oxygen.  · Fluids through an IV tube.  · Medicines to increase your blood pressure.  · A machine to clean your blood (dialysis) if your kidneys fail.  · A machine to help you breathe if your lungs fail.  Get help right away if:  You get an infection or develop any of the signs and symptoms of sepsis after surgery or a hospitalization.  This information is not intended to replace advice given to you by your health care provider. Make sure you discuss any questions you have with your health care provider.  Document Released: 09/15/2004 Document Revised: 05/25/2017 Document Reviewed: 08/25/2014  [employer] Interactive Patient Education © 2017 [employer]      · Is patient discharged on Warfarin / Coumadin?   No     Depression / Suicide Risk    As you are discharged from this [facility] facility, it is important to learn how to keep safe from harming yourself.    Recognize the warning signs:  · Abrupt changes in personality, positive or negative- including increase in energy   · Giving away possessions  · Change in eating patterns- significant weight changes-  positive or negative  · Change in sleeping patterns- unable to sleep or sleeping all the time   · Unwillingness or inability to communicate  · Depression  · Unusual sadness, discouragement and loneliness  · Talk of wanting to die  · Neglect of personal appearance   · Rebelliousness- reckless behavior  · Withdrawal from people/activities they love  · Confusion- inability to concentrate     If you or a loved one  observes any of these behaviors or has concerns about self-harm, here's what you can do:  · Talk about it- your feelings and reasons for harming yourself  · Remove any means that you might use to hurt yourself (examples: pills, rope, extension cords, firearm)  · Get professional help from the community (Mental Health, Substance Abuse, psychological counseling)  · Do not be alone:Call your Safe Contact- someone whom you trust who will be there for you.  · Call your local CRISIS HOTLINE 736-6153 or 308-782-8822  · Call your local Children's Mobile Crisis Response Team [place] (083) 903-1983 or www.InHomeVest  · Call the toll free National Suicide Prevention Hotlines   · National Suicide Prevention Lifeline 959-665-BLYD (1863)  · National Hope Line Network 800-SUICIDE (892-6197)            Influenza, Adult  Influenza, more commonly known as “the flu,” is a viral infection that primarily affects the respiratory tract. The respiratory tract includes organs that help you breathe, such as the lungs, nose, and throat. The flu causes many common cold symptoms, as well as a high fever and body aches.  The flu spreads easily from person to person (is contagious). Getting a flu shot (influenza vaccination) every year is the best way to prevent influenza.  What are the causes?  Influenza is caused by a virus. You can catch the virus by:  · Breathing in droplets from an infected person's cough or sneeze.  · Touching something that was recently contaminated with the virus and then touching your mouth, nose, or eyes.  What increases the risk?  The following factors may make you more likely to get the flu:  · Not cleaning your hands frequently with soap and water or alcohol-based hand .  · Having close contact with many people during cold and flu season.  · Touching your mouth, eyes, or nose without washing or sanitizing your hands first.  · Not drinking enough fluids or not eating a healthy diet.  · Not getting  enough sleep or exercise.  · Being under a high amount of stress.  · Not getting a yearly (annual) flu shot.  You may be at a higher risk of complications from the flu, such as a severe lung infection (pneumonia), if you:  · Are over the age of 65.  · Are pregnant.  · Have a weakened disease-fighting system (immune system). You may have a weakened immune system if you:  ¨ Have HIV or AIDS.  ¨ Are undergoing chemotherapy.  ¨ Are taking medicines that reduce the activity of (suppress) the immune system.  · Have a long-term (chronic) illness, such as heart disease, kidney disease, diabetes, or lung disease.  · Have a liver disorder.  · Are obese.  · Have anemia.  What are the signs or symptoms?  Symptoms of this condition typically last 4-10 days and may include:  · Fever.  · Chills.  · Headache, body aches, or muscle aches.  · Sore throat.  · Cough.  · Runny or congested nose.  · Chest discomfort and cough.  · Poor appetite.  · Weakness or tiredness (fatigue).  · Dizziness.  · Nausea or vomiting.  How is this diagnosed?  This condition may be diagnosed based on your medical history and a physical exam. Your health care provider may do a nose or throat swab test to confirm the diagnosis.  How is this treated?  If influenza is detected early, you can be treated with antiviral medicine that can reduce the length of your illness and the severity of your symptoms. This medicine may be given by mouth (orally) or through an IV tube that is inserted in one of your veins.  The goal of treatment is to relieve symptoms by taking care of yourself at home. This may include taking over-the-counter medicines, drinking plenty of fluids, and adding humidity to the air in your home.  In some cases, influenza goes away on its own. Severe influenza or complications from influenza may be treated in a hospital.  Follow these instructions at home:  · Take over-the-counter and prescription medicines only as told by your health care  provider.  · Use a cool mist humidifier to add humidity to the air in your home. This can make breathing easier.  · Rest as needed.  · Drink enough fluid to keep your urine clear or pale yellow.  · Cover your mouth and nose when you cough or sneeze.  · Wash your hands with soap and water often, especially after you cough or sneeze. If soap and water are not available, use hand .  · Stay home from work or school as told by your health care provider. Unless you are visiting your health care provider, try to avoid leaving home until your fever has been gone for 24 hours without the use of medicine.  · Keep all follow-up visits as told by your health care provider. This is important.  How is this prevented?  · Getting an annual flu shot is the best way to avoid getting the flu. You may get the flu shot in late summer, fall, or winter. Ask your health care provider when you should get your flu shot.  · Wash your hands often or use hand  often.  · Avoid contact with people who are sick during cold and flu season.  · Eat a healthy diet, drink plenty of fluids, get enough sleep, and exercise regularly.  Contact a health care provider if:  · You develop new symptoms.  · You have:  ¨ Chest pain.  ¨ Diarrhea.  ¨ A fever.  · Your cough gets worse.  · You produce more mucus.  · You feel nauseous or you vomit.  Get help right away if:  · You develop shortness of breath or difficulty breathing.  · Your skin or nails turn a bluish color.  · You have severe pain or stiffness in your neck.  · You develop a sudden headache or sudden pain in your face or ear.  · You cannot stop vomiting.  This information is not intended to replace advice given to you by your health care provider. Make sure you discuss any questions you have with your health care provider.  Document Released: 12/15/2001 Document Revised: 05/25/2017 Document Reviewed: 10/11/2016  [employer] Interactive Patient Education © 2017 [employer]

## 2019-03-11 NOTE — CARE PLAN
Problem: Infection  Goal: Will remain free from infection  Outcome: PROGRESSING AS EXPECTED  Con't to have low grade fever, 99.7. BP stable off Levophed. Tolerating diet  Intervention: Assess signs and symptoms of infection  Instructed to wash hands after each bathroom episode and as needed. Remains on droplet precautions  Intervention: Implement standard precautions and perform hand washing before and after patient contact  done

## 2019-03-11 NOTE — DISCHARGE SUMMARY
"Discharge Summary    CHIEF COMPLAINT ON ADMISSION  Chief Complaint   Patient presents with   • ALOC     \"I just don't feel like myself\"   • Fever     100.8 in triage. Taking antibiotics for upper respiratory/otitis media       Reason for Admission  Confused/UTI     Admission Date  3/9/2019    CODE STATUS  Full Code    HPI & HOSPITAL COURSE  This is a 48 y.o. female with past medical history of celiac disease, well-controlled type 2 diabetes mellitus on metformin who came in due to generalized weakness, arthralgia, myalgias, fever and chills.  According to the patient, she went to her PCP a few days ago with similar symptoms and was given a prescription for amoxicillin.  There was also questionable UTI and was given a script for macrobid.  She took the antibiotics but with no improvement.  On the day of admission she was dizzy and lethargic, confused and disoriented.  She  came in and was found to be hypotensive and septic.  Influenza screening was positive for influenza A and she also had sinusitis on CT scan.  She was started on aggressive resuscitation with IV fluids as well as pressor support.   She did improve on ceftriaxone and tamiflu, and by day of discharge, she was on room air and felt much better, with just a mild headache.  She was given motrin for this.    Therefore, she is discharged in fair and stable condition to home with close outpatient follow-up.    The patient met 2-midnight criteria for an inpatient stay at the time of discharge.    Discharge Date  03/11/19      FOLLOW UP ITEMS POST DISCHARGE  Return to ER if any trouble breathing or fever    DISCHARGE DIAGNOSES  Principal Problem (Resolved):    Septic shock (HCC) POA: Yes  Active Problems:    Influenza A POA: Yes    Acute maxillary sinusitis POA: Yes    Mild persistent asthma without complication POA: Yes      Overview: Exercise induced asthma. Only with strenuous exercise. PRN Albuterol       inhaler.     Celiac disease POA: Yes      Overview: " Diagnosed by GI in [place] by official testing and endoscopy.      Doing well with gluten free diet.    History of attention deficit disorder POA: Yes    Type 2 diabetes mellitus (HCC) POA: Yes  Resolved Problems:    Hypokalemia POA: Yes    Hypomagnesemia POA: Yes    Hypophosphatemia POA: Yes    Hyponatremia POA: Yes    Polycythemia POA: Yes      FOLLOW UP  Future Appointments  Date Time Provider Department Center   3/12/2019 9:40 AM [facility] MANA[facility] [address]   3/20/2019 9:00 AM Geno Pop M.D. [address]   3/27/2019 8:10 AM Huan Medrano [facility] [place]     MEDICATIONS ON DISCHARGE     Medication List      START taking these medications      Instructions   oseltamivir 75 MG Caps  Commonly known as:  TAMIFLU   Take 1 Cap by mouth every 12 hours for 5 doses.  Dose:  75 mg     sulfamethoxazole-trimethoprim 800-160 MG tablet  Start taking on:  3/12/2019  Commonly known as:  BACTRIM DS   Take 1 Tab by mouth 2 times a day for 3 days.  Dose:  1 Tab        CONTINUE taking these medications      Instructions   amoxicillin-clavulanate 875-125 MG Tabs  Commonly known as:  AUGMENTIN   Take 1 Tab by mouth 2 times a day.  Dose:  1 Tab     metFORMIN  MG Tb24  Commonly known as:  GLUCOPHAGE XR   Take 1 Tab by mouth every day for 90 days.  Dose:  500 mg     nitrofurantoin monohydr macro 100 MG Caps  Commonly known as:  MACROBID   Take 100 mg by mouth 2 times a day.  Dose:  100 mg     THERAFLU EXPRESSMAX SEV CLD/FL 5--325 MG/15ML Liqd  Generic drug:  Phenylephrine-DM-GG-APAP   Take 15-30 mL by mouth 2 times a day as needed (cold/flu symptoms).  Dose:  15-30 mL     THERAFLU SEVERE COLD/CGH DAY 10-5-325 MG Tabs  Generic drug:  DM-Phenylephrine-Acetaminophen   Take 1-2 Tabs by mouth 2 times a day as needed (cold/flu symptoms).  Dose:  1-2 Tab     VYVANSE 70 MG capsule  Generic drug:  lisdexamfetamine   Take 70 mg by mouth every morning.  Dose:  70 mg            Allergies  Allergies   Allergen  "Reactions   • Gluten Meal Unspecified     Pt has celiac    • Tylenol Shortness of Breath     Pt states, \"it makes me wheeze\"       DIET  Orders Placed This Encounter   Procedures   • Diet Order Regular (advance as tolerated)     Standing Status:   Standing     Number of Occurrences:   1     Order Specific Question:   Diet:     Answer:   Regular [1]     Comments:   advance as tolerated     Order Specific Question:   Miscellaneous modifications:     Answer:   Gluten Free per MD [9]       ACTIVITY  As tolerated.  Weight bearing as tolerated    CONSULTATIONS  Intensivist    PROCEDURES  Central venous catheter placement - internal jugular vein  3/9    LABORATORY  Lab Results   Component Value Date    SODIUM 140 03/11/2019    POTASSIUM 4.1 03/11/2019    CHLORIDE 113 (H) 03/11/2019    CO2 22 03/11/2019    GLUCOSE 103 (H) 03/11/2019    BUN 5 (L) 03/11/2019    CREATININE 0.52 03/11/2019        Lab Results   Component Value Date    WBC 5.7 03/11/2019    HEMOGLOBIN 11.7 (L) 03/11/2019    HEMATOCRIT 35.8 (L) 03/11/2019    PLATELETCT 211 03/11/2019        Total time of the discharge process exceeds 35 minutes.  "

## 2019-03-11 NOTE — PROGRESS NOTES
Report received from Hamzah JAUREGUI and care assumed of pt. Levophed off since early this am and MAP remains >65. Relates she is feeling better but still has some difficulty breathing. Pt has orders for transfer to medical bed, no beds available. Pt informed and updated with POC.

## 2019-03-12 ENCOUNTER — PATIENT OUTREACH (OUTPATIENT)
Dept: HEALTH INFORMATION MANAGEMENT | Facility: OTHER | Age: 49
End: 2019-03-12

## 2019-03-12 ASSESSMENT — ENCOUNTER SYMPTOMS
SHORTNESS OF BREATH: 0
CHILLS: 0
SPUTUM PRODUCTION: 0
COUGH: 1
FEVER: 0
MYALGIAS: 1
NAUSEA: 1
HEADACHES: 1
WEAKNESS: 0

## 2019-03-12 NOTE — DISCHARGE SUMMARY
"Discharge Summary    CHIEF COMPLAINT ON ADMISSION  Chief Complaint   Patient presents with   • ALOC     \"I just don't feel like myself\"   • Fever     100.8 in triage. Taking antibiotics for upper respiratory/otitis media       Reason for Admission  Confused/UTI     Admission Date  3/9/2019    CODE STATUS  Prior    HPI & HOSPITAL COURSE   This is a 48 y.o. female with past medical history of celiac disease, will controlled type 2 diabetes mellitus on metformin, who presented with generalized weakness, arthralgia, myalgias, fever and chills.  According to the patient, she had gone to her PCP a few days prior with similar symptoms, and was given a prescription for amoxicillin.  She took the antibiotics but with no improvement.  On the day of admission she was dizzy and lethargic, confused and disoriented.  She came in and was found to be hypotensive and septic.  Influenza screening was positive for influenza A and she was treated with fluids, tamiflu, and ceftriaxone to cover for bacterial process.  She responded quite well over the next couple of days and was able to come off pressor support the day prior to discharge. She also was saturating well on room air.    Therefore, she is discharged in good and stable condition to home with close outpatient follow-up.    The patient met 2-midnight criteria for an inpatient stay at the time of discharge.    Discharge Date  3/11/2019    FOLLOW UP ITEMS POST DISCHARGE  Return to ER if any trouble breathing, fever     DISCHARGE DIAGNOSES  Principal Problem:    Septic shock (HCC) POA: Yes  Active Problems:    Influenza A POA: Yes    Acute maxillary sinusitis POA: Yes    Mild persistent asthma without complication POA: Yes      Overview: Exercise induced asthma. Only with strenuous exercise. PRN Albuterol       inhaler.     Celiac disease POA: Yes      Overview: Diagnosed by GI in [place] by official testing and endoscopy.      Doing well with gluten free diet.    Type 2 diabetes " mellitus (HCC) POA: Yes    History of attention deficit disorder POA: Yes  Resolved Problems:    Hypokalemia POA: Yes    Hypomagnesemia POA: Yes    Hypophosphatemia POA: Yes    Hyponatremia POA: Yes    Polycythemia POA: Yes      FOLLOW UP  Future Appointments  Date Time Provider Department Center   3/13/2019 9:40 AM Geno Pop M.D. [address]   3/27/2019 8:10 AM [facility]     Geno Pop M.D.  [address]  [address]  [place] [address]  [phone]      The appointment with Dr. Pop is a one time hospital follow up visit only. You are still scheduled to establish with a primary care provider on 3/27. Thank you.    Kelle Tovar  [address]  [place] [address]  [phone]            MEDICATIONS ON DISCHARGE     Medication List      START taking these medications      Instructions   oseltamivir 75 MG Caps  Commonly known as:  TAMIFLU   Take 1 Cap by mouth every 12 hours for 5 doses.  Dose:  75 mg     sulfamethoxazole-trimethoprim 800-160 MG tablet  Commonly known as:  BACTRIM DS   Take 1 Tab by mouth 2 times a day for 3 days.  Dose:  1 Tab        CONTINUE taking these medications      Instructions   amoxicillin-clavulanate 875-125 MG Tabs  Commonly known as:  AUGMENTIN   Take 1 Tab by mouth 2 times a day.  Dose:  1 Tab     metFORMIN  MG Tb24  Commonly known as:  GLUCOPHAGE XR   Take 1 Tab by mouth every day for 90 days.  Dose:  500 mg     nitrofurantoin monohydr macro 100 MG Caps  Commonly known as:  MACROBID   Take 100 mg by mouth 2 times a day.  Dose:  100 mg     THERAFLU EXPRESSMAX SEV CLD/FL 5--325 MG/15ML Liqd  Generic drug:  Phenylephrine-DM-GG-APAP   Take 15-30 mL by mouth 2 times a day as needed (cold/flu symptoms).  Dose:  15-30 mL     THERAFLU SEVERE COLD/CGH DAY 10-5-325 MG Tabs  Generic drug:  DM-Phenylephrine-Acetaminophen   Take 1-2 Tabs by mouth 2 times a day as needed (cold/flu symptoms).  Dose:  1-2 Tab     VYVANSE 70 MG capsule  Generic  "drug:  lisdexamfetamine   Take 70 mg by mouth every morning.  Dose:  70 mg            Allergies  Allergies   Allergen Reactions   • Gluten Meal Unspecified     Pt has celiac    • Tylenol Shortness of Breath     Pt states, \"it makes me wheeze\"       DIET  Regular    ACTIVITY  As tolerated.  Weight bearing as tolerated    CONSULTATIONS  Intensivist     PROCEDURES  RIJ central line 3/9/19     LABORATORY  Lab Results   Component Value Date    SODIUM 140 03/11/2019    POTASSIUM 4.1 03/11/2019    CHLORIDE 113 (H) 03/11/2019    CO2 22 03/11/2019    GLUCOSE 103 (H) 03/11/2019    BUN 5 (L) 03/11/2019    CREATININE 0.52 03/11/2019        Lab Results   Component Value Date    WBC 5.7 03/11/2019    HEMOGLOBIN 11.7 (L) 03/11/2019    HEMATOCRIT 35.8 (L) 03/11/2019    PLATELETCT 211 03/11/2019        Total time of the discharge process exceeds 35 minutes.  "

## 2019-03-12 NOTE — ASSESSMENT & PLAN NOTE
SSI as needed  Serial blood sugar  Diabetic education as needed  Resume home regimen as clinically appropriate

## 2019-03-13 ENCOUNTER — OFFICE VISIT (OUTPATIENT)
Dept: MEDICAL GROUP | Facility: MEDICAL CENTER | Age: 49
End: 2019-03-13
Payer: COMMERCIAL

## 2019-03-13 ENCOUNTER — HOSPITAL ENCOUNTER (OUTPATIENT)
Dept: LAB | Facility: MEDICAL CENTER | Age: 49
End: 2019-03-13
Attending: FAMILY MEDICINE
Payer: COMMERCIAL

## 2019-03-13 ENCOUNTER — TELEPHONE (OUTPATIENT)
Dept: MEDICAL GROUP | Facility: MEDICAL CENTER | Age: 49
End: 2019-03-13

## 2019-03-13 ENCOUNTER — HOSPITAL ENCOUNTER (OUTPATIENT)
Dept: RADIOLOGY | Facility: MEDICAL CENTER | Age: 49
End: 2019-03-13
Attending: FAMILY MEDICINE
Payer: COMMERCIAL

## 2019-03-13 VITALS
OXYGEN SATURATION: 98 % | BODY MASS INDEX: 21.03 KG/M2 | HEART RATE: 88 BPM | RESPIRATION RATE: 14 BRPM | WEIGHT: 142 LBS | TEMPERATURE: 96.8 F | DIASTOLIC BLOOD PRESSURE: 74 MMHG | SYSTOLIC BLOOD PRESSURE: 124 MMHG | HEIGHT: 69 IN

## 2019-03-13 DIAGNOSIS — R05.9 COUGH: ICD-10-CM

## 2019-03-13 DIAGNOSIS — R03.0 BLOOD PRESSURE ELEVATED WITHOUT HISTORY OF HTN: ICD-10-CM

## 2019-03-13 DIAGNOSIS — R10.2 PELVIC PAIN: ICD-10-CM

## 2019-03-13 DIAGNOSIS — Z09 HOSPITAL DISCHARGE FOLLOW-UP: ICD-10-CM

## 2019-03-13 DIAGNOSIS — J90 BILATERAL PLEURAL EFFUSION: ICD-10-CM

## 2019-03-13 DIAGNOSIS — R73.03 PREDIABETES: ICD-10-CM

## 2019-03-13 DIAGNOSIS — J10.1 INFLUENZA A: ICD-10-CM

## 2019-03-13 DIAGNOSIS — Z86.19 HISTORY OF SEPTIC SHOCK: ICD-10-CM

## 2019-03-13 DIAGNOSIS — J01.00 ACUTE MAXILLARY SINUSITIS, RECURRENCE NOT SPECIFIED: ICD-10-CM

## 2019-03-13 LAB
ALBUMIN SERPL BCP-MCNC: 4.3 G/DL (ref 3.2–4.9)
ALBUMIN/GLOB SERPL: 1.2 G/DL
ALP SERPL-CCNC: 60 U/L (ref 30–99)
ALT SERPL-CCNC: 30 U/L (ref 2–50)
ANION GAP SERPL CALC-SCNC: 10 MMOL/L (ref 0–11.9)
APPEARANCE UR: CLEAR
AST SERPL-CCNC: 22 U/L (ref 12–45)
BILIRUB SERPL-MCNC: 0.5 MG/DL (ref 0.1–1.5)
BILIRUB UR QL STRIP.AUTO: NEGATIVE
BUN SERPL-MCNC: 6 MG/DL (ref 8–22)
CALCIUM SERPL-MCNC: 9.4 MG/DL (ref 8.5–10.5)
CHLORIDE SERPL-SCNC: 104 MMOL/L (ref 96–112)
CO2 SERPL-SCNC: 23 MMOL/L (ref 20–33)
COLOR UR: YELLOW
CREAT SERPL-MCNC: 0.69 MG/DL (ref 0.5–1.4)
GLOBULIN SER CALC-MCNC: 3.7 G/DL (ref 1.9–3.5)
GLUCOSE SERPL-MCNC: 77 MG/DL (ref 65–99)
GLUCOSE UR STRIP.AUTO-MCNC: NEGATIVE MG/DL
KETONES UR STRIP.AUTO-MCNC: ABNORMAL MG/DL
LEUKOCYTE ESTERASE UR QL STRIP.AUTO: NEGATIVE
MICRO URNS: ABNORMAL
NITRITE UR QL STRIP.AUTO: NEGATIVE
PH UR STRIP.AUTO: 8 [PH]
POTASSIUM SERPL-SCNC: 3.5 MMOL/L (ref 3.6–5.5)
PROT SERPL-MCNC: 8 G/DL (ref 6–8.2)
PROT UR QL STRIP: NEGATIVE MG/DL
RBC UR QL AUTO: NEGATIVE
SODIUM SERPL-SCNC: 137 MMOL/L (ref 135–145)
SP GR UR STRIP.AUTO: 1.01
UROBILINOGEN UR STRIP.AUTO-MCNC: 0.2 MG/DL

## 2019-03-13 PROCEDURE — 71046 X-RAY EXAM CHEST 2 VIEWS: CPT

## 2019-03-13 PROCEDURE — 81003 URINALYSIS AUTO W/O SCOPE: CPT

## 2019-03-13 PROCEDURE — 36415 COLL VENOUS BLD VENIPUNCTURE: CPT

## 2019-03-13 PROCEDURE — 80053 COMPREHEN METABOLIC PANEL: CPT

## 2019-03-13 PROCEDURE — 99215 OFFICE O/P EST HI 40 MIN: CPT | Performed by: FAMILY MEDICINE

## 2019-03-13 NOTE — PATIENT INSTRUCTIONS
If you need further assistance, or have any questions; concerns or lingering symptoms before seeing your Primary Care Provider or specialist.     Do not hesitate to contact us.     Please contact us at the Post-Hospital Follow Up Program at [phone] and ask for the Medical Assistant for Dr Pop.   Our offices hours are Monday-Friday 8 am-5 pm.

## 2019-03-13 NOTE — PROGRESS NOTES
Subjective:     Britt Subramanian is a 48 y.o. female who presents for Hospital Follow-up.  Chart and discharge summary reviewed.   Date of discharge 3/11/19.  48- hour post discharge RN call completed on 3/12/19 and documented in the medical record by Teresa Guzmán RN:  POST DISCHARGE CALL:  Discharge Date:3/11/2019   Date of Outreach Call: 3/12/2019  9:48 AM  Now that you're home, how are you doing? Fair  Comment:Pt reports she is still feeling weak. Reports no  fever. States she is eating and taking fluids.  Do you have questions about your medications? Yes  Comment:Pt would like to discuss medications with PCP.  Patient states she has concerns about her kidney function.  Reports she was told to hold metformin. CM did not see order  for patient to hold metformin. She will discuss with PCP.    Did you fill your medications? Yes    Do you have a follow-up appointment scheduled?Yes  Comment:CM assisted patient scheduling a sooner  appointment. Post discharge clinic 3/13    Discharging Department: [facility]    Number of Attempts: 1  Current or previous attempts completed within two business days of discharge? Yes  Provided education regarding treatment plan, medication, self-management, ADLs? Yes  Comment:CM reviewed ER precautions if any worsening  symptoms including shortness of breath, fever. Pt verbalized  understanding.  Has patient completed Advance Directive? If yes, advise them to bring to appointment. No  Care Manager phone number provided? Yes  Is there anything else I can help you with? No         HPI: Recently hospitalized for septic shock and influenza A.  CAT scan also showed acute sinusitis.  She required aggressive resuscitation with IV fluids and pressors.  She improved with ceftriaxone and Tamiflu.  She was discharged on Tamiflu and Bactrim.  Her history is significant for prediabetes for which she takes metformin.    Since returning home, patient reports feeling anxious about how sick she got  and also does not feel completely recovered.  She is having a cough.  She continues to have discomfort in her pelvic region.    She bought a blood pressure machine yesterday with the wrist cuff and checked her blood pressure twice.  Both times it was elevated with systolics in the 150s and on one occasion the diastolic was just over 100.    The patient has questions regarding hospitalization or discharge: The patient had multiple questions which were answered.  The patient still has some weakness; denies difficulty taking care of self at home.  Patient reports taking medications as instructed.    Current Outpatient Prescriptions   Medication Sig Dispense Refill   • oseltamivir (TAMIFLU) 75 MG Cap Take 1 Cap by mouth every 12 hours for 5 doses. 5 Cap 0   • sulfamethoxazole-trimethoprim (BACTRIM DS) 800-160 MG tablet Take 1 Tab by mouth 2 times a day for 3 days. 6 Tab 0   • nitrofurantoin monohydr macro (MACROBID) 100 MG Cap Take 100 mg by mouth 2 times a day.     • amoxicillin-clavulanate (AUGMENTIN) 875-125 MG Tab Take 1 Tab by mouth 2 times a day.     • lisdexamfetamine (VYVANSE) 70 MG capsule Take 70 mg by mouth every morning.     • DM-Phenylephrine-Acetaminophen (THERAFLU SEVERE COLD/CGH DAY) 10-5-325 MG Tab Take 1-2 Tabs by mouth 2 times a day as needed (cold/flu symptoms).     • Phenylephrine-DM-GG-APAP (THERAFLU EXPRESSMAX SEV CLD/FL) 5--325 MG/15ML Liquid Take 15-30 mL by mouth 2 times a day as needed (cold/flu symptoms).     • metFORMIN ER (GLUCOPHAGE XR) 500 MG TABLET SR 24 HR Take 1 Tab by mouth every day for 90 days. 90 Tab 2     No current facility-administered medications for this visit.        Allergies:   Gluten meal and Tylenol    Social History:  Social History   Substance Use Topics   • Smoking status: Never Smoker   • Smokeless tobacco: Never Used   • Alcohol use No        Family History:  Family History   Problem Relation Age of Onset   • Other Mother         DVTs, IVC filter in place   •  Diabetes Father    • Other Father         gout   • No Known Problems Sister    • Diabetes Brother    • Cancer Maternal Grandmother         skin cancer   • Breast Cancer Maternal Aunt        Past Medical History:   Diagnosis Date   • Celiac disease    • Celiac disease 6/22/2018    Diagnosed by GI in [place] by official testing and endoscopy. Doing well with gluten free diet.   • History of insulin resistance 2012   • Mild persistent asthma without complication 6/22/2018    Exercise induced asthma. Only with strenuous exercise. PRN Albuterol inhaler.    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    • Vitamin D deficiency 6/22/2018    Taking OTC.        Surgical History  Past Surgical History:   Procedure Laterality Date   • HYSTERECTOMY, VAGINAL  2010    heavy menses   • PB ENLARGE BREAST WITH IMPLANT  2009   • PB REDUCTION OF LARGE BREAST  2009   • APPENDECTOMY  1999       ROS:    Constitutional:  Denies fever, chills, night sweats.  HENT: Denies head congestion, ear pain or drainage, decreased hearing, sore throat  Eyes: Denies visual changes, eye drainage or redness, eye pain  Neck: Denies pain, swollen glands, decreased range of motion  Lungs: She complains of a cough and sometimes shortness of breath.  Cardiovascular: Denies chest pain, orthopnea, lower extremity edema, palpitations  Abdominal: Denies change in bowel or bladder habits, nausea or vomiting.  She has pain both sides and pelvic region.  Musculoskeletal: Denies back pain, joint pains, decreased range of motion  Endocrine: Denies unexplained weight changes, heat or cold intolerance, hair loss, polyuria or polydipsia  Neurological: Denies dizziness, headache, confusion, focal weakness or numbness, memory loss  Psychiatric: She takes Vyvanse for ADD.       Objective:     Blood pressure 124/74, pulse 88, temperature 36 °C (96.8 °F), temperature source Temporal, resp.  "rate 14, height 1.753 m (5' 9\"), weight 64.4 kg (142 lb), SpO2 98 %, not currently breastfeeding.     Physical Exam:    GEN:  Alert, oriented, in no distress, mildly anxious.  HEENT:  PERRLA, EOMI  NECK;  Supple without cervical adenopathy   LUNGS:  Clear to auscultation without rales, rhonci, or wheezes.  CV:  Heart RRR without murmurs or S3 or S4  ABD:  Soft, mild tenderness to palpation in pelvic region, no guarding or rebound, nondistended, normal bowel sounds.  No hepatosplenomegaly.  EXT:  Without cyanosis, clubbing, or edema.  NEURO:  Cranial nerves II through XII intact.  Motor function and sensation intact.  SKIN: No rashes or suspicious lesions.  PSYCH:  Behavior is appropriate.      Assessment and Plan:     1. Hospital follow-up:   Hospitalization and results reviewed with patient. High risk conditions requiring teaching or care coordination were identified and addressed.The patient demonstrate understanding of admission and underlying conditions. The patient understands discharge instructions and when to seek medical attention. Medications reviewed including instructions regarding high risk medications, dosing and side effects.    The patient is able to safely adhere to ADL/IADL, treatment and medication regimen, self-manage of high-risk conditions? Yes  The patient requires physical therapy/home health/DME referral? No   The patient requires referral to care coordination/behavioral health/social work?  No   Patient requires referral for pharmacy consult? No     2. History of septic shock  -Much better but still recovering from the effects of this.    3. Influenza A  -She is finishing the course of Tamiflu.    4. Acute maxillary sinusitis, recurrence not specified  -She is completing a course of Bactrim.    5. Blood pressure elevated without history of HTN  -She is advised to exchange her machine for one that uses an upper arm cuff instead of a wrist cuff.  She will continue to monitor her blood pressure " and bring in the readings at her upcoming PCP appointment.  She is counseled to avoid salt.  - Comp Metabolic Panel; Future    6. Cough    - DX-CHEST-2 VIEWS; Future    7. Pelvic pain    - URINALYSIS,CULTURE IF INDICATED; Future  - US-PELVIC TRANSVAGINAL ONLY; Future.  Patient states she has had a hysterectomy but still has her ovaries.    8. Prediabetes  -She is continuing metformin.  - Comp Metabolic Panel; Future        Medication Reconciliation  Medication list at end of encounter:   Current Outpatient Prescriptions   Medication Sig Dispense Refill   • oseltamivir (TAMIFLU) 75 MG Cap Take 1 Cap by mouth every 12 hours for 5 doses. 5 Cap 0   • sulfamethoxazole-trimethoprim (BACTRIM DS) 800-160 MG tablet Take 1 Tab by mouth 2 times a day for 3 days. 6 Tab 0   • nitrofurantoin monohydr macro (MACROBID) 100 MG Cap Take 100 mg by mouth 2 times a day.     • amoxicillin-clavulanate (AUGMENTIN) 875-125 MG Tab Take 1 Tab by mouth 2 times a day.     • lisdexamfetamine (VYVANSE) 70 MG capsule Take 70 mg by mouth every morning.     • DM-Phenylephrine-Acetaminophen (THERAFLU SEVERE COLD/CGH DAY) 10-5-325 MG Tab Take 1-2 Tabs by mouth 2 times a day as needed (cold/flu symptoms).     • Phenylephrine-DM-GG-APAP (THERAFLU EXPRESSMAX SEV CLD/FL) 5--325 MG/15ML Liquid Take 15-30 mL by mouth 2 times a day as needed (cold/flu symptoms).     • metFORMIN ER (GLUCOPHAGE XR) 500 MG TABLET SR 24 HR Take 1 Tab by mouth every day for 90 days. 90 Tab 2     No current facility-administered medications for this visit.        Primary care follow-up:    Recommended followup:  with new PCP  Future Appointments       Provider Department Center    3/27/2019 8:10 AM Huan Medrano [facility]          Patient Instruction  Patient provided with educational material on discharge diagnosis and management of symptoms/red flags. Patient instructed to keep follow-up appointments and to bring written questions and and  actual medications to each office visit. Patient instructed to call PCP/specialist with any problems/questions/concerns. Patient verbalizes understanding and has no further questions at this time.    Total of 50 minutes face-to-face time was spent with patient, with greater than 50% of the time spent in counseling and answering all of her questions about her illness, test results, continuing symptoms, and coordination of care.

## 2019-03-13 NOTE — TELEPHONE ENCOUNTER
1. Caller Name: Britt                                         Call Back Number: [phone] (home)         Patient approves a detailed voicemail message: yes    Asked all her questions but forgot to ask about when to return to work. Will need a medical release letter stating she can return to work. Told her I would send message to Dr. Pop & I will call her back.

## 2019-03-14 LAB
BACTERIA BLD CULT: NORMAL
BACTERIA BLD CULT: NORMAL
BACTERIA UR CULT: ABNORMAL
SIGNIFICANT IND 70042: ABNORMAL
SIGNIFICANT IND 70042: NORMAL
SIGNIFICANT IND 70042: NORMAL
SITE SITE: ABNORMAL
SITE SITE: NORMAL
SITE SITE: NORMAL
SOURCE SOURCE: ABNORMAL
SOURCE SOURCE: NORMAL
SOURCE SOURCE: NORMAL

## 2019-03-20 ENCOUNTER — OFFICE VISIT (OUTPATIENT)
Dept: URGENT CARE | Facility: PHYSICIAN GROUP | Age: 49
End: 2019-03-20
Payer: COMMERCIAL

## 2019-03-20 ENCOUNTER — HOSPITAL ENCOUNTER (EMERGENCY)
Facility: MEDICAL CENTER | Age: 49
End: 2019-03-21
Attending: EMERGENCY MEDICINE
Payer: COMMERCIAL

## 2019-03-20 ENCOUNTER — HOSPITAL ENCOUNTER (OUTPATIENT)
Dept: RADIOLOGY | Facility: MEDICAL CENTER | Age: 49
End: 2019-03-20
Attending: FAMILY MEDICINE
Payer: COMMERCIAL

## 2019-03-20 VITALS
OXYGEN SATURATION: 100 % | HEART RATE: 88 BPM | DIASTOLIC BLOOD PRESSURE: 68 MMHG | BODY MASS INDEX: 21.56 KG/M2 | WEIGHT: 146 LBS | TEMPERATURE: 98.3 F | SYSTOLIC BLOOD PRESSURE: 96 MMHG | RESPIRATION RATE: 16 BRPM

## 2019-03-20 DIAGNOSIS — J90 BILATERAL PLEURAL EFFUSION: ICD-10-CM

## 2019-03-20 DIAGNOSIS — R53.1 GENERALIZED WEAKNESS: ICD-10-CM

## 2019-03-20 DIAGNOSIS — I95.9 HYPOTENSION, UNSPECIFIED HYPOTENSION TYPE: ICD-10-CM

## 2019-03-20 PROBLEM — H66.90 ACUTE OTITIS MEDIA: Status: RESOLVED | Noted: 2019-03-01 | Resolved: 2019-03-20

## 2019-03-20 LAB
ALBUMIN SERPL BCP-MCNC: 3.9 G/DL (ref 3.2–4.9)
ALBUMIN/GLOB SERPL: 1.6 G/DL
ALP SERPL-CCNC: 59 U/L (ref 30–99)
ALT SERPL-CCNC: 24 U/L (ref 2–50)
ANION GAP SERPL CALC-SCNC: 12 MMOL/L (ref 0–11.9)
APPEARANCE UR: ABNORMAL
AST SERPL-CCNC: 17 U/L (ref 12–45)
BACTERIA #/AREA URNS HPF: ABNORMAL /HPF
BASOPHILS # BLD AUTO: 1.4 % (ref 0–1.8)
BASOPHILS # BLD: 0.12 K/UL (ref 0–0.12)
BILIRUB SERPL-MCNC: 0.4 MG/DL (ref 0.1–1.5)
BILIRUB UR QL STRIP.AUTO: NEGATIVE
BUN SERPL-MCNC: 18 MG/DL (ref 8–22)
CALCIUM SERPL-MCNC: 9.4 MG/DL (ref 8.5–10.5)
CHLORIDE SERPL-SCNC: 107 MMOL/L (ref 96–112)
CO2 SERPL-SCNC: 21 MMOL/L (ref 20–33)
COLOR UR: YELLOW
CREAT SERPL-MCNC: 0.83 MG/DL (ref 0.5–1.4)
EOSINOPHIL # BLD AUTO: 0.29 K/UL (ref 0–0.51)
EOSINOPHIL NFR BLD: 3.3 % (ref 0–6.9)
EPI CELLS #/AREA URNS HPF: ABNORMAL /HPF
ERYTHROCYTE [DISTWIDTH] IN BLOOD BY AUTOMATED COUNT: 45.2 FL (ref 35.9–50)
GLOBULIN SER CALC-MCNC: 2.5 G/DL (ref 1.9–3.5)
GLUCOSE SERPL-MCNC: 76 MG/DL (ref 65–99)
GLUCOSE UR STRIP.AUTO-MCNC: NEGATIVE MG/DL
HCT VFR BLD AUTO: 42.5 % (ref 37–47)
HGB BLD-MCNC: 14 G/DL (ref 12–16)
HYALINE CASTS #/AREA URNS LPF: ABNORMAL /LPF
IMM GRANULOCYTES # BLD AUTO: 0.02 K/UL (ref 0–0.11)
IMM GRANULOCYTES NFR BLD AUTO: 0.2 % (ref 0–0.9)
KETONES UR STRIP.AUTO-MCNC: NEGATIVE MG/DL
LACTATE BLD-SCNC: 1.6 MMOL/L (ref 0.5–2)
LEUKOCYTE ESTERASE UR QL STRIP.AUTO: NEGATIVE
LYMPHOCYTES # BLD AUTO: 4.32 K/UL (ref 1–4.8)
LYMPHOCYTES NFR BLD: 48.8 % (ref 22–41)
MCH RBC QN AUTO: 31 PG (ref 27–33)
MCHC RBC AUTO-ENTMCNC: 32.9 G/DL (ref 33.6–35)
MCV RBC AUTO: 94.2 FL (ref 81.4–97.8)
MICRO URNS: ABNORMAL
MONOCYTES # BLD AUTO: 0.66 K/UL (ref 0–0.85)
MONOCYTES NFR BLD AUTO: 7.4 % (ref 0–13.4)
NEUTROPHILS # BLD AUTO: 3.45 K/UL (ref 2–7.15)
NEUTROPHILS NFR BLD: 38.9 % (ref 44–72)
NITRITE UR QL STRIP.AUTO: NEGATIVE
NRBC # BLD AUTO: 0 K/UL
NRBC BLD-RTO: 0 /100 WBC
PH UR STRIP.AUTO: 7.5 [PH]
PLATELET # BLD AUTO: 383 K/UL (ref 164–446)
PMV BLD AUTO: 10.2 FL (ref 9–12.9)
POTASSIUM SERPL-SCNC: 3.6 MMOL/L (ref 3.6–5.5)
PROT SERPL-MCNC: 6.4 G/DL (ref 6–8.2)
PROT UR QL STRIP: NEGATIVE MG/DL
RBC # BLD AUTO: 4.51 M/UL (ref 4.2–5.4)
RBC # URNS HPF: ABNORMAL /HPF
RBC UR QL AUTO: NEGATIVE
SODIUM SERPL-SCNC: 140 MMOL/L (ref 135–145)
SP GR UR STRIP.AUTO: 1.02
UROBILINOGEN UR STRIP.AUTO-MCNC: 0.2 MG/DL
WBC # BLD AUTO: 8.9 K/UL (ref 4.8–10.8)
WBC #/AREA URNS HPF: ABNORMAL /HPF

## 2019-03-20 PROCEDURE — 81001 URINALYSIS AUTO W/SCOPE: CPT

## 2019-03-20 PROCEDURE — 85025 COMPLETE CBC W/AUTO DIFF WBC: CPT

## 2019-03-20 PROCEDURE — 99284 EMERGENCY DEPT VISIT MOD MDM: CPT

## 2019-03-20 PROCEDURE — 87040 BLOOD CULTURE FOR BACTERIA: CPT

## 2019-03-20 PROCEDURE — 80053 COMPREHEN METABOLIC PANEL: CPT

## 2019-03-20 PROCEDURE — 99215 OFFICE O/P EST HI 40 MIN: CPT | Performed by: FAMILY MEDICINE

## 2019-03-20 PROCEDURE — 71046 X-RAY EXAM CHEST 2 VIEWS: CPT

## 2019-03-20 PROCEDURE — 83605 ASSAY OF LACTIC ACID: CPT

## 2019-03-20 PROCEDURE — 87086 URINE CULTURE/COLONY COUNT: CPT

## 2019-03-20 PROCEDURE — 700105 HCHG RX REV CODE 258: Performed by: EMERGENCY MEDICINE

## 2019-03-20 PROCEDURE — 36415 COLL VENOUS BLD VENIPUNCTURE: CPT

## 2019-03-20 RX ORDER — SODIUM CHLORIDE 9 MG/ML
1000 INJECTION, SOLUTION INTRAVENOUS
Status: DISCONTINUED | OUTPATIENT
Start: 2019-03-20 | End: 2019-03-21 | Stop reason: HOSPADM

## 2019-03-20 RX ORDER — SODIUM CHLORIDE 9 MG/ML
30 INJECTION, SOLUTION INTRAVENOUS ONCE
Status: COMPLETED | OUTPATIENT
Start: 2019-03-20 | End: 2019-03-20

## 2019-03-20 RX ADMIN — SODIUM CHLORIDE 1986 ML: 9 INJECTION, SOLUTION INTRAVENOUS at 21:37

## 2019-03-21 VITALS
DIASTOLIC BLOOD PRESSURE: 76 MMHG | SYSTOLIC BLOOD PRESSURE: 133 MMHG | RESPIRATION RATE: 18 BRPM | HEART RATE: 76 BPM | OXYGEN SATURATION: 98 % | HEIGHT: 69 IN | WEIGHT: 145.94 LBS | TEMPERATURE: 98.2 F | BODY MASS INDEX: 21.62 KG/M2

## 2019-03-21 NOTE — PROGRESS NOTES
Patient received discharge instructions and verbalizes understanding including picking up new prescriptions and follow up appointments. Patient chose to keep all her belongings with her in the room and all were taken by her upon discharge.The patient expressed no questions or concerns regarding discharge.    No indicators present

## 2019-03-21 NOTE — ED TRIAGE NOTES
"Britt Subramanian  48 y.o. female  Chief Complaint   Patient presents with   • Weakness     sent from  for hypotension and \"black out vision\" for < 5 seconds.      Pt BIB [facility] from  for hypotension and a brief moment of vision loss. Pt reports \"black out vision\" for a couple of seconds. Pt received 4mg Zofran and  250 mL NS. BP upon arrival was 132/76, HR 89, 96 % RA. Pt is A&Ox4 and has complaints of weakness. Pt BG 74. 20 G R AC  "

## 2019-03-21 NOTE — ED PROVIDER NOTES
"ED Provider Note    CHIEF COMPLAINT  Chief Complaint   Patient presents with   • Weakness     sent from  for hypotension and \"black out vision\" for < 5 seconds.        HPI  Britt Subramanian is a 48 y.o. female who presents with weakness.  She describes it as syncopal episode at home.  It was while she was standing.  She did not have a secondary injury injury such as head injury or extremity injury.  She describes as feeling generalized weak.  She states that she was getting better after her hospitalization.  No fever no chills no chest pain no vertigo.    Patient has a recent history of septic shock from either sinus infection/influenza A.  She was in the hospital.  Was recently discharged she states she is feeling better.  She does have celiac disease as well.  Patient at this point states that she did not feel better today she has finished her antibiotics recently.  She went to urgent care and subsequently was sent here.    REVIEW OF SYSTEMS  General: No fever or chills.  Eyes: No eye discharge. No eye pain.  Ear nose throat: No sore throat or  trouble swallowing.  Pulmonary: No shortness of breath or cough.  Cardiovascular: No chest pain or chest pressure.  GI: No abdominal pain nausea or vomiting.  : No dysuria or hematuria  Dermatologic: No rashes. No abrasions.  Neurologic: Generalized weakness.  Psychiatric: No anxiety or stress.      All other systems are negative      PAST MEDICAL HISTORY  Past Medical History:   Diagnosis Date   • Celiac disease    • Celiac disease 6/22/2018    Diagnosed by GI in [place] by official testing and endoscopy. Doing well with gluten free diet.   • History of insulin resistance 2012   • Mild persistent asthma without complication 6/22/2018    Exercise induced asthma. Only with strenuous exercise. PRN Albuterol inhaler.    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    • Vitamin D deficiency " "6/22/2018    Taking OTC.        FAMILY HISTORY  Family History   Problem Relation Age of Onset   • Other Mother         DVTs, IVC filter in place   • Diabetes Father    • Other Father         gout   • No Known Problems Sister    • Diabetes Brother    • Cancer Maternal Grandmother         skin cancer   • Breast Cancer Maternal Aunt        SOCIAL HISTORY  Social History     Social History   • Marital status:      Spouse name: N/A   • Number of children: 3   • Years of education: N/A     Occupational History   • [employer]     Social History Main Topics   • Smoking status: Never Smoker   • Smokeless tobacco: Never Used   • Alcohol use No   • Drug use: No   • Sexual activity: Yes     Partners: Male     Other Topics Concern   • Not on file     Social History Narrative   • No narrative on file       SURGICAL HISTORY  Past Surgical History:   Procedure Laterality Date   • HYSTERECTOMY, VAGINAL  2010    heavy menses   • PB ENLARGE BREAST WITH IMPLANT  2009   • PB REDUCTION OF LARGE BREAST  2009   • APPENDECTOMY  1999       CURRENT MEDICATIONS  Home Medications     Reviewed by Dinesh Altamirano R.N. (Registered Nurse) on 03/20/19 at 2045  Med List Status: Partial   Medication Last Dose Status   amoxicillin-clavulanate (AUGMENTIN) 875-125 MG Tab  Active   DM-Phenylephrine-Acetaminophen (THERAFLU SEVERE COLD/CGH DAY) 10-5-325 MG Tab  Active   lisdexamfetamine (VYVANSE) 70 MG capsule  Active   metFORMIN (GLUCOPHAGE) 500 MG Tab  Active   nitrofurantoin monohydr macro (MACROBID) 100 MG Cap  Active   Phenylephrine-DM-GG-APAP (THERAFLU EXPRESSMAX SEV CLD/FL) 5--325 MG/15ML Liquid  Active                 ALLERGIES  Allergies   Allergen Reactions   • Gluten Meal Unspecified     Pt has celiac    • Tylenol Shortness of Breath     Pt states, \"it makes me wheeze\"       PHYSICAL EXAM  VITAL SIGNS: /76   Pulse 89   Temp 36.8 °C (98.2 °F) (Temporal)   Resp 15   Ht 1.753 m (5' 9\")   Wt 66.2 kg (145 lb " 15.1 oz)   SpO2 96%   BMI 21.55 kg/m²   Constitutional: Well developed, Well nourished, No acute distress, Non-toxic appearance.   HENT: Normocephalic, Atraumatic, Bilateral external ears normal, Oropharynx moist, No oral exudates, Nose normal.   Eyes: PERRLA, EOMI, Conjunctiva normal, No discharge.   Musculoskeletal: Neck has normal range of motion, No tenderness, Supple.   Lymphatic: No cervical lymphadenopathy noted.   Cardiovascular: Normal heart rate, Normal rhythm, No murmurs, No rubs, No gallops.   Thorax & Lungs: Normal breath sounds, No respiratory distress, No wheezing, No chest tenderness.   Abdomen: non distended no tender no masses soft  Skin: Warm, Dry, No erythema, No rash.   : No CVA tenderness.   Psychiatric: Calm, not anxious  Neurologic: Alert & oriented, moves all extremities equally    RADIOLOGY/PROCEDURES  Results for orders placed or performed during the hospital encounter of 03/20/19   LACTIC ACID   Result Value Ref Range    Lactic Acid 1.6 0.5 - 2.0 mmol/L   CBC WITH DIFFERENTIAL   Result Value Ref Range    WBC 8.9 4.8 - 10.8 K/uL    RBC 4.51 4.20 - 5.40 M/uL    Hemoglobin 14.0 12.0 - 16.0 g/dL    Hematocrit 42.5 37.0 - 47.0 %    MCV 94.2 81.4 - 97.8 fL    MCH 31.0 27.0 - 33.0 pg    MCHC 32.9 (L) 33.6 - 35.0 g/dL    RDW 45.2 35.9 - 50.0 fL    Platelet Count 383 164 - 446 K/uL    MPV 10.2 9.0 - 12.9 fL    Neutrophils-Polys 38.90 (L) 44.00 - 72.00 %    Lymphocytes 48.80 (H) 22.00 - 41.00 %    Monocytes 7.40 0.00 - 13.40 %    Eosinophils 3.30 0.00 - 6.90 %    Basophils 1.40 0.00 - 1.80 %    Immature Granulocytes 0.20 0.00 - 0.90 %    Nucleated RBC 0.00 /100 WBC    Neutrophils (Absolute) 3.45 2.00 - 7.15 K/uL    Lymphs (Absolute) 4.32 1.00 - 4.80 K/uL    Monos (Absolute) 0.66 0.00 - 0.85 K/uL    Eos (Absolute) 0.29 0.00 - 0.51 K/uL    Baso (Absolute) 0.12 0.00 - 0.12 K/uL    Immature Granulocytes (abs) 0.02 0.00 - 0.11 K/uL    NRBC (Absolute) 0.00 K/uL   COMP METABOLIC PANEL   Result Value  Ref Range    Sodium 140 135 - 145 mmol/L    Potassium 3.6 3.6 - 5.5 mmol/L    Chloride 107 96 - 112 mmol/L    Co2 21 20 - 33 mmol/L    Anion Gap 12.0 (H) 0.0 - 11.9    Glucose 76 65 - 99 mg/dL    Bun 18 8 - 22 mg/dL    Creatinine 0.83 0.50 - 1.40 mg/dL    Calcium 9.4 8.5 - 10.5 mg/dL    AST(SGOT) 17 12 - 45 U/L    ALT(SGPT) 24 2 - 50 U/L    Alkaline Phosphatase 59 30 - 99 U/L    Total Bilirubin 0.4 0.1 - 1.5 mg/dL    Albumin 3.9 3.2 - 4.9 g/dL    Total Protein 6.4 6.0 - 8.2 g/dL    Globulin 2.5 1.9 - 3.5 g/dL    A-G Ratio 1.6 g/dL   URINALYSIS   Result Value Ref Range    Color Yellow     Character Cloudy (A)     Specific Gravity 1.018 <1.035    Ph 7.5 5.0 - 8.0    Glucose Negative Negative mg/dL    Ketones Negative Negative mg/dL    Protein Negative Negative mg/dL    Bilirubin Negative Negative    Urobilinogen, Urine 0.2 Negative    Nitrite Negative Negative    Leukocyte Esterase Negative Negative    Occult Blood Negative Negative    Micro Urine Req Microscopic    URINE MICROSCOPIC (W/UA)   Result Value Ref Range    WBC 2-5 /hpf    RBC 0-2 /hpf    Bacteria Few (A) None /hpf    Epithelial Cells Few /hpf    Hyaline Cast 0-2 /lpf   ESTIMATED GFR   Result Value Ref Range    GFR If African American >60 >60 mL/min/1.73 m 2    GFR If Non African American >60 >60 mL/min/1.73 m 2         COURSE & MEDICAL DECISION MAKING  Pertinent Labs & Imaging studies reviewed. (See chart for details)  Here with a history of weakness.  It is described as one episode of near syncope.  It happened at home.  However, patient sent here for urgent care for blacked out vision for 5 seconds.  Patient here did appear pale was concerned about possible recurrent infection so septic workup was started.  Patient received IV fluids and responded nicely and upon repeat examination had better skin color felt energetic and looked more lively.  At this point, lactate was normal urinalysis showed no definitive signs of infection white cell count was  normal.    Urine culture showed Gardnerella previously and Candida.    Right now the patient is clinically improved patient had been diagnosed recently with influenza sepsis and possible sinusitis she is taking her medication as directed I think the patient may had some deconditioning and perhaps some dehydration but at this point with improving clinical's findings no source of infection no laboratory evaluation of lactated and the patient be safely discharged home with repeat evaluation with her primary care doctor and of course return to the ER if she starts feeling weak again dizzy fever or any other symptoms.    FINAL IMPRESSION  1.  Weakness  2.  Near syncope  3.      Electronically signed by: Darius Smith, 3/20/2019 9:33 PM

## 2019-03-22 LAB
BACTERIA UR CULT: NORMAL
SIGNIFICANT IND 70042: NORMAL
SITE SITE: NORMAL
SOURCE SOURCE: NORMAL

## 2019-03-24 NOTE — PROGRESS NOTES
Chief Complaint   Patient presents with   • Dizziness     x1 day, low blood pressure, blacked out, spots in vision when walking           HPI:      Pt recently was discharged from ICU for sepsis, which was presumed from sinusitis and influenza A.     She had a syncopal episode at home today, while standing.   There was no seizure activity.   She did not hit her head, and there were no secondary injuries.     She feels dizzy.   Worse with standing.   She is having a difficult time walking.   She states that she feels overall weak and dizzy.   She was discharged on antibiotic, but she finished this yesterday.           No double vision, hearing loss, numbness, nausea, vomiting, headache, slurred speech or shortness of breath.   Denies depression, anxiety.    Past Medical History:   Diagnosis Date   • Mild persistent asthma without complication 6/22/2018    Exercise induced asthma. Only with strenuous exercise. PRN Albuterol inhaler.    • Celiac disease 6/22/2018    Diagnosed by GI in [place] by official testing and endoscopy. Doing well with gluten free diet.   • Vitamin D deficiency 6/22/2018    Taking OTC.    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    • History of insulin resistance 2012   • Celiac disease        Social History   Substance Use Topics   • Smoking status: Never Smoker   • Smokeless tobacco: Never Used   • Alcohol use No             Review of Systems   Constitutional: Negative for fever, chills .   + malaise/fatigue.   Eyes: Negative for vision changes, d/c.    Denies diplopia  Respiratory: Negative for cough and sputum production.    Cardiovascular: Negative for chest pain and palpitations.   Gastrointestinal: Negative for nausea, vomiting, abdominal pain, diarrhea and constipation.   Genitourinary: Negative for dysuria, urgency and frequency.   Skin: Negative for rash or  itching.   Neurological: + for  dizziness.   Denies HA.   Psychiatric/Behavioral: Negative for depression.   Hematologic/lymphatic - denies bruising or excessive bleeding  All other systems reviewed and are negative.       Objective:     Blood pressure (!) 96/68, pulse 88, temperature 36.8 °C (98.3 °F), resp. rate 16, weight 66.2 kg (146 lb), SpO2 100 %, not currently breastfeeding.      Physical Exam   Constitutional: She is oriented to person, place, and time. She appears ill    HENT:   Mucous membranes dry  Head: Normocephalic and atraumatic. EOMI.  PERRLA.  No nystagmus  Mouth/Throat: No oropharyngeal exudate.   TMs normal   Eyes: Conjunctivae are normal.   Cardiovascular: Normal rate, regular rhythm and normal heart sounds.    Pulmonary/Chest: Effort normal and breath sounds normal. No respiratory distress. Pt has no wheezes, rales.   Neurological: pt is alert and oriented to person, place, and time. No cranial nerve deficit. Coordination and gait normal.   Skin: Skin is warm. She is not diaphoretic. No erythema.   Psychiatric:  behavior is normal.   Nursing note and vitals reviewed.              Assessment/Plan:                1. Hypotension, unspecified hypotension type  Her blood pressure is dangerously low.     Suspicious for early sepsis.   ICU notes were reviewed - she did require pressors to keep BP up in ICU.       She will require a higher level of care.       Called report to ERP at [facility] ED    Reiterated to patient that although a provider to provider transfer was made this will not necessarily expedite the ER process    Transport via ambulance

## 2019-03-25 LAB
BACTERIA BLD CULT: NORMAL
SIGNIFICANT IND 70042: NORMAL
SITE SITE: NORMAL
SOURCE SOURCE: NORMAL

## 2019-03-27 ENCOUNTER — OFFICE VISIT (OUTPATIENT)
Dept: MEDICAL GROUP | Facility: PHYSICIAN GROUP | Age: 49
End: 2019-03-27
Payer: COMMERCIAL

## 2019-03-27 ENCOUNTER — HOSPITAL ENCOUNTER (OUTPATIENT)
Dept: RADIOLOGY | Facility: MEDICAL CENTER | Age: 49
End: 2019-03-27
Attending: FAMILY MEDICINE
Payer: COMMERCIAL

## 2019-03-27 VITALS
WEIGHT: 145 LBS | DIASTOLIC BLOOD PRESSURE: 64 MMHG | OXYGEN SATURATION: 99 % | BODY MASS INDEX: 21.98 KG/M2 | HEIGHT: 68 IN | SYSTOLIC BLOOD PRESSURE: 102 MMHG | RESPIRATION RATE: 12 BRPM | HEART RATE: 78 BPM | TEMPERATURE: 97.4 F

## 2019-03-27 DIAGNOSIS — R10.2 PELVIC PAIN: ICD-10-CM

## 2019-03-27 DIAGNOSIS — A41.9 SEPTIC SHOCK (HCC): ICD-10-CM

## 2019-03-27 DIAGNOSIS — Z51.81 MEDICATION MONITORING ENCOUNTER: ICD-10-CM

## 2019-03-27 DIAGNOSIS — R73.03 PREDIABETES: ICD-10-CM

## 2019-03-27 DIAGNOSIS — R73.9 HYPERGLYCEMIA: ICD-10-CM

## 2019-03-27 DIAGNOSIS — E53.8 VITAMIN B12 DEFICIENCY: ICD-10-CM

## 2019-03-27 DIAGNOSIS — R53.83 FATIGUE, UNSPECIFIED TYPE: ICD-10-CM

## 2019-03-27 DIAGNOSIS — E55.9 VITAMIN D DEFICIENCY: ICD-10-CM

## 2019-03-27 DIAGNOSIS — N83.202 LEFT OVARIAN CYST: ICD-10-CM

## 2019-03-27 DIAGNOSIS — R01.1 CARDIAC MURMUR: ICD-10-CM

## 2019-03-27 DIAGNOSIS — R65.21 SEPTIC SHOCK (HCC): ICD-10-CM

## 2019-03-27 DIAGNOSIS — F90.9 ADULT ADHD (ATTENTION DEFICIT HYPERACTIVITY DISORDER): ICD-10-CM

## 2019-03-27 PROBLEM — J10.1 INFLUENZA A: Status: RESOLVED | Noted: 2019-03-09 | Resolved: 2019-03-27

## 2019-03-27 PROBLEM — Z86.59 HISTORY OF ATTENTION DEFICIT DISORDER: Status: RESOLVED | Noted: 2019-03-09 | Resolved: 2019-03-27

## 2019-03-27 PROBLEM — Z00.00 ANNUAL PHYSICAL EXAM: Status: RESOLVED | Noted: 2018-06-22 | Resolved: 2019-03-27

## 2019-03-27 PROBLEM — J01.00 ACUTE MAXILLARY SINUSITIS: Status: RESOLVED | Noted: 2019-03-09 | Resolved: 2019-03-27

## 2019-03-27 PROBLEM — N30.00 ACUTE CYSTITIS WITHOUT HEMATURIA: Status: RESOLVED | Noted: 2018-12-04 | Resolved: 2019-03-27

## 2019-03-27 PROBLEM — R68.89 FLU-LIKE SYMPTOMS: Status: RESOLVED | Noted: 2019-03-01 | Resolved: 2019-03-27

## 2019-03-27 PROBLEM — R55 SYNCOPAL EPISODES: Status: RESOLVED | Noted: 2018-08-07 | Resolved: 2019-03-27

## 2019-03-27 PROCEDURE — 99215 OFFICE O/P EST HI 40 MIN: CPT | Performed by: FAMILY MEDICINE

## 2019-03-27 PROCEDURE — 76830 TRANSVAGINAL US NON-OB: CPT

## 2019-03-27 RX ORDER — METFORMIN HYDROCHLORIDE 500 MG/1
500 TABLET, EXTENDED RELEASE ORAL DAILY
COMMUNITY
End: 2019-03-27

## 2019-03-27 RX ORDER — LISDEXAMFETAMINE DIMESYLATE CAPSULES 70 MG/1
70 CAPSULE ORAL EVERY MORNING
Qty: 30 CAP | Refills: 0 | Status: SHIPPED | OUTPATIENT
Start: 2019-03-27 | End: 2019-04-26

## 2019-03-27 NOTE — PROGRESS NOTES
cc: Septic shock hospitalization, ADHD, pelvic pain, prediabetes    Subjective:     Britt Subramanian is a 48 y.o. female presenting was recently hospitalized on March 9 for sepsis with unknown source with a questionable urinary tract infection and possible sinus infection.  She was given Rocephin and Tamiflu and was hospitalized.  She was seen in urgent care March 20 subsequently on discharge for having low blood pressure and still having weakness and sent to the ER where a repeat chest x-ray was done with pleural effusions that resolved and urinalysis and blood culture which was normal and a lactic acid which was normal.  She was given IV fluids and previous culture which showed Candida and Gardnerella was resolved on this culture and she was thought to have influenza sepsis and sinus infection and was discharged from the ER with no medications.  She reports that her appetite is doing better and she is feeling stronger.  She has been checking her blood pressures at home and the lowest her systolic has been is 112 and highest has been 145 and the lowest her diastolic has been is 71 and the highest it has been is 95.  She did have one low reading of her blood pressure this morning at 98/53.  She does report that she is been having some bilateral right and left lower pelvic pain but she reports she is urinating well and stooling well.  She did have a hysterectomy but has both bilateral ovaries.  She does report that her sinuses have cleared up from her sinus infection previously.  She was positive for influenza A and was treated with tamiflu. She reports that she has had borderline diabetes and her last A1c on March 9 was 5.7.  She reports she is currently taking metformin 500 mg once a day with meals.  She is not currently checking her sugars.  She also reports having ADHD and being on Vyvanse for about one year and a half 70 mg daily.  She denies any weight loss on Vyvanse.  She reports she has had ADHD her whole  "life and when she worked a slower pace job she was able to get off of this but now working in the presence she reports that she cannot function without this dose of Vyvanse.  She denies trying any other medication recently.  Her A1c in August 2018 was also 5.7.  She denies any vaginal bleeding or any vaginal discharge.  Lives with her son. Works full time. LCSW mental health. No social or domestic concerns.  She reports she is back at work right now from her hospitalization and is feeling better reports she is okay to be out of work full-time.  Review of systems:     Constitutional: Negative for fever, chills and positive fatigue.   HENT: Negative for sinus pressure, negative for ear pain or hearing loss  Eyes: Negative for blurriness, negative for double vision  Respiratory: Negative for cough and shortness of breath, negative for exertional shortness of breath  Cardiovascular: Negative for leg swelling, negative for palpitations, negative for chest pain  Gastrointestinal: Negative for nausea, vomiting, positive abdominal pain, negative constipation and diarrhea.  Genitourinary: Negative for dysuria and hematuria.   Skin: Negative for rash.   Neurological: Negative for dizziness, focal weakness and headaches.   Endo/Heme/Allergies: Denies bleeding, bruising, and recurrent allergies.  Psychiatric/Behavioral: Negative for depression and anxiety.  Positive for ADHD        Current Outpatient Prescriptions:   •  lisdexamfetamine (VYVANSE) 70 MG capsule, Take 1 Cap by mouth every morning for 30 days., Disp: 30 Cap, Rfl: 0    Allergies, past medical history, past surgical history, family history, social history reviewed and updated    Objective:     Vitals: /64 (BP Location: Left arm, Patient Position: Sitting, BP Cuff Size: Adult)   Pulse 78   Temp 36.3 °C (97.4 °F) (Temporal)   Resp 12   Ht 1.734 m (5' 8.25\")   Wt 65.8 kg (145 lb)   SpO2 99%   BMI 21.89 kg/m²   General: Alert, pleasant, NAD  HEENT: " Normocephalic.  Nontraumatic. EOMI, no icterus or pallor.  Conjunctivae and lids normal. External ears normal. Oropharynx non-erythematous, mucous membranes moist.  Neck supple.  No thyromegaly or masses palpated. No cervical or supraclavicular lymphadenopathy.  Heart: Regular rate and rhythm.  S1 and S2 normal.  Grade 3 systolic ejection murmur.  Respiratory: Normal respiratory effort.  Clear to auscultation bilaterally.  Abdomen: Non-distended, soft, mild tenderness on deep palpation of bilateral lower right and left quadrant.  Skin: Warm, dry, no rashes.  Musculoskeletal: Gait is normal.  Moves all extremities well.  Extremities: No leg edema.  Pedal pulses 2+ symmetric.   Psych:  Affect/mood is anxious, judgement is moderate and poor insight into diabetes and had many questions about her urine culture which was negative and explained her last urine culture was mixed skin cristhian, memory is intact, grooming is appropriate.  Good focus and eye contact    Assessment/Plan:     Diagnoses and all orders for this visit:    Prediabetes  -     HEMOGLOBIN A1C; Future    Hyperglycemia  -     HEMOGLOBIN A1C; Future    Fatigue, unspecified type  -     CBC WITH DIFFERENTIAL; Future  -     TSH WITH REFLEX TO FT4; Future    Medication monitoring encounter  -     Comp Metabolic Panel; Future    Vitamin D deficiency  -     VITAMIN D,25 HYDROXY; Future    Vitamin B12 deficiency  -     VITAMIN B12; Future    Pelvic pain  -     URINALYSIS,CULTURE IF INDICATED; Future    Adult ADHD (attention deficit hyperactivity disorder)  -     lisdexamfetamine (VYVANSE) 70 MG capsule; Take 1 Cap by mouth every morning for 30 days.    Cardiac murmur  -     EC-ECHOCARDIOGRAM COMPLETE W/ CONT; Future    Septic shock (HCC)  -     EC-ECHOCARDIOGRAM COMPLETE W/ CONT; Future    -Last A1c with patient was 5.7, discussed with the patient that she is not in the diabetic range and looking at her previous sugars that were low and due to concern for  hypoglycemia discussed in extensive discussion about the side effects being on a medication such as metformin which could affect the kidneys and reduce the sugars especially as she is not checking her sugars.  We did offer to try a glucometer with this but she is not wanting to do needles which I agree with an A1c of 5.7 that daily checking of sugars is not necessary especially as she is watching her diet and exercise.  Especially with her recent hospitalization with sepsis, concerned for her to be on metformin when she does not have diabetes and for her sugars to go too low so I will recommend and stop metformin.  Due to her mild bilateral pelvic pain she already has a pelvic ultrasound scheduled to evaluate from previous doctor and I will repeat her urinalysis with culture if indicated.  Also please go and get your CBC, CMP, thyroid, vitamin D and B12.  In 3 months I would recommend to recheck your A1c and if below 6.5 then just work with diet and exercise or consider glucometer sugar checking.  Increase hydration with water and ensure that you are taking at least 8 glasses of more of water daily.  Her blood pressures have been okay except one reading and would recommend her blood pressures to be above 100/60 and if they fall below that then to have this checked and also for her heart rate to be between .  Also she is been on Vyvanse 70 mg for ADHD with her last fill March 11 which she takes once daily in the morning.  She is not currently seeing a counselor or psychiatrist and has been stable on this medication.  Did discuss since this is a high dose to have this monitored with her heart rate and blood pressure and weight.  I did recommend a referral to have this reevaluated at this dose.  But she would rather see one primary care physician who could potentially prescribe this medication.  I will give a 30-day prescription for her to fill on 4/11/19 to give her some time to find another either primary care  provider or see if she needs referral to see psychiatry.  We will also recommend getting an echo ultrasound of her heart due to slight murmur and her recent hospital stay with sepsis which has improved now and her blood pressure today is slowly increasing and her heart rate is stable.  She has good oxygenation. Follow up offered but she wants to find another doctor where she can continue to get Vyvanse.  Patient was very adamant on wanting to take metformin and concerned she will get diabetes and was not aware that A1c of 5.7 was not diabetic.  She also really wanted the Vyvanse 70 mg and reports other techniques did not help and does not want to see a psychiatrist for this and just wants to go to one primary care provider to get this medication.    Return if symptoms worsen or fail to improve.    Patient was seen for 60 minutes face-to-face of which greater than 50% of the visit was spent in counseling and coordination of care as documented above in their assessment and plan.

## 2019-03-27 NOTE — PATIENT INSTRUCTIONS
Diagnoses and all orders for this visit:    Prediabetes  -     HEMOGLOBIN A1C; Future    Hyperglycemia  -     HEMOGLOBIN A1C; Future    Fatigue, unspecified type  -     CBC WITH DIFFERENTIAL; Future  -     TSH WITH REFLEX TO FT4; Future    Medication monitoring encounter  -     Comp Metabolic Panel; Future    Vitamin D deficiency  -     VITAMIN D,25 HYDROXY; Future    Vitamin B12 deficiency  -     VITAMIN B12; Future    Pelvic pain  -     URINALYSIS,CULTURE IF INDICATED; Future    Adult ADHD (attention deficit hyperactivity disorder)    Cardiac murmur  -     EC-ECHOCARDIOGRAM COMPLETE W/ CONT; Future    Septic shock (HCC)  -     EC-ECHOCARDIOGRAM COMPLETE W/ CONT; Future    -Last A1c with patient was 5.7, discussed with the patient that she is not in the diabetic range and looking at her previous sugars that were low and due to concern for hypoglycemia discussed in extensive discussion about the side effects being on a medication such as metformin which could affect the kidneys and reduce the sugars especially as she is not checking her sugars.  We did offer to try a glucometer with this but she is not wanting to do needles which I agree with an A1c of 5.7 that daily checking of sugars is not necessary especially as she is watching her diet and exercise.  Especially with her recent hospitalization with sepsis, concerned for her to be on metformin when she does not have diabetes and for her sugars to go too low so I will recommend and stop metformin.  Due to her mild bilateral pelvic pain she already has a pelvic ultrasound scheduled to evaluate from previous doctor and I will repeat her urinalysis with culture if indicated.  Also please go and get your CBC, CMP, thyroid, vitamin D and B12.  In 3 months I would recommend to recheck your A1c and if below 6.5 then just work with diet and exercise or consider glucometer sugar checking.  Increase hydration with water and ensure that you are taking at least 8 glasses of  more of water daily.  Her blood pressures have been okay except one reading and would recommend her blood pressures to be above 100/60 and if they fall below that then to have this checked and also for her heart rate to be between .  Also she is been on Vyvanse 70 mg for ADHD with her last fill March 11 which she takes once daily in the morning.  She is not currently seeing a counselor or psychiatrist and has been stable on this medication.  Did discuss since this is a high dose to have this monitored with her heart rate and blood pressure and weight.  I did recommend a referral to have this reevaluated at this dose.  But she would rather see one primary care physician who could potentially prescribe this medication.  I will give a 30-day prescription for her to fill on 4/11/19 to give her some time to find another either primary care provider or see if she needs referral to see psychiatry.  We will also recommend getting an echo ultrasound of her heart due to slight murmur and her recent hospital stay with sepsis which has improved now and her blood pressure today is slowly increasing and her heart rate is stable.  She has good oxygenation. Follow up offered but she wants to find another doctor where she can continue to get Vyvanse.    Return if symptoms worsen or fail to improve.      Preventing Type 2 Diabetes Mellitus  Type 2 diabetes (type 2 diabetes mellitus) is a long-term (chronic) disease that affects blood sugar (glucose) levels. Normally, a hormone called insulin allows glucose to enter cells in the body. The cells use glucose for energy. In type 2 diabetes, one or both of these problems may be present:  · The body does not make enough insulin.  · The body does not respond properly to insulin that it makes (insulin resistance).  Insulin resistance or lack of insulin causes excess glucose to build up in the blood instead of going into cells. As a result, high blood glucose (hyperglycemia) develops,  which can cause many complications. Being overweight or obese and having an inactive (sedentary) lifestyle can increase your risk for diabetes. Type 2 diabetes can be delayed or prevented by making certain nutrition and lifestyle changes.  What nutrition changes can be made?  · Eat healthy meals and snacks regularly. Keep a healthy snack with you for when you get hungry between meals, such as fruit or a handful of nuts.  · Eat lean meats and proteins that are low in saturated fats, such as chicken, fish, egg whites, and beans. Avoid processed meats.  · Eat plenty of fruits and vegetables and plenty of grains that have not been processed (whole grains). It is recommended that you eat:  ¨ 1½?2 cups of fruit every day.  ¨ 2½?3 cups of vegetables every day.  ¨ 6?8 oz of whole grains every day, such as oats, whole wheat, bulgur, brown rice, quinoa, and millet.  · Eat low-fat dairy products, such as milk, yogurt, and cheese.  · Eat foods that contain healthy fats, such as nuts, avocado, olive oil, and canola oil.  · Drink water throughout the day. Avoid drinks that contain added sugar, such as soda or sweet tea.  · Follow instructions from your health care provider about specific eating or drinking restrictions.  · Control how much food you eat at a time (portion size).  ¨ Check food labels to find out the serving sizes of foods.  ¨ Use a kitchen scale to weigh amounts of foods.  · Saute or steam food instead of frying it. Cook with water or broth instead of oils or butter.  · Limit your intake of:  ¨ Salt (sodium). Have no more than 1 tsp (2,400 mg) of sodium a day. If you have heart disease or high blood pressure, have less than ½?¾ tsp (1,500 mg) of sodium a day.  ¨ Saturated fat. This is fat that is solid at room temperature, such as butter or fat on meat.  What lifestyle changes can be made?   Activity  · Do moderate-intensity physical activity for at least 30 minutes on at least 5 days of the week, or as much as  told by your health care provider.  · Ask your health care provider what activities are safe for you. A mix of physical activities may be best, such as walking, swimming, cycling, and strength training.  · Try to add physical activity into your day. For example:  ¨ Park in spots that are farther away than usual, so that you walk more. For example, park in a far corner of the parking lot when you go to the office or the grocery store.  ¨ Take a walk during your lunch break.  ¨ Use stairs instead of elevators or escalators.  Weight Loss  · Lose weight as directed. Your health care provider can determine how much weight loss is best for you and can help you lose weight safely.  · If you are overweight or obese, you may be instructed to lose at least 5?7 % of your body weight.  Alcohol and Tobacco   · Limit alcohol intake to no more than 1 drink a day for nonpregnant women and 2 drinks a day for men. One drink equals 12 oz of beer, 5 oz of wine, or 1½ oz of hard liquor.  · Do not use any tobacco products, such as cigarettes, chewing tobacco, and e-cigarettes. If you need help quitting, ask your health care provider.  Work With Your Health Care Provider  · Have your blood glucose tested regularly, as told by your health care provider.  · Discuss your risk factors and how you can reduce your risk for diabetes.  · Get screening tests as told by your health care provider. You may have screening tests regularly, especially if you have certain risk factors for type 2 diabetes.  · Make an appointment with a diet and nutrition specialist (registered dietitian). A registered dietitian can help you make a healthy eating plan and can help you understand portion sizes and food labels.  Why are these changes important?  · It is possible to prevent or delay type 2 diabetes and related health problems by making lifestyle and nutrition changes.  · It can be difficult to recognize signs of type 2 diabetes. The best way to avoid possible  damage to your body is to take actions to prevent the disease before you develop symptoms.  What can happen if changes are not made?  · Your blood glucose levels may keep increasing. Having high blood glucose for a long time is dangerous. Too much glucose in your blood can damage your blood vessels, heart, kidneys, nerves, and eyes.  · You may develop prediabetes or type 2 diabetes. Type 2 diabetes can lead to many chronic health problems and complications, such as:  ¨ Heart disease.  ¨ Stroke.  ¨ Blindness.  ¨ Kidney disease.  ¨ Depression.  ¨ Poor circulation in the feet and legs, which could lead to surgical removal (amputation) in severe cases.  Where to find support:  · Ask your health care provider to recommend a registered dietitian, diabetes educator, or weight loss program.  · Look for local or online weight loss groups.  · Join a gym, fitness club, or outdoor activity group, such as a walking club.  Where to find more information:  To learn more about diabetes and diabetes prevention, visit:  · American Diabetes Association (ADA): www.diabetes.org  · National Cross of Diabetes and Digestive and Kidney Diseases: www.niddk.nih.gov/health-information/diabetes  To learn more about healthy eating, visit:  · The U.[place]. Department of Agriculture ([employer]), Choose My Plate: www.Bo[employer].gov/food-groups  · Office of Disease Prevention and Health Promotion (ODPHP), Dietary Guidelines: www.health.gov/dietaryguidelines  Summary  · You can reduce your risk for type 2 diabetes by increasing your physical activity, eating healthy foods, and losing weight as directed.  · Talk with your health care provider about your risk for type 2 diabetes. Ask about any blood tests or screening tests that you need to have.  This information is not intended to replace advice given to you by your health care provider. Make sure you discuss any questions you have with your health care provider.    Heart Murmur  A heart murmur is an  "extra sound heard by your health care provider when listening to your heart with a device called a stethoscope. The sound comes from turbulence when blood flows through the heart and may be a \"hum\" or \"whoosh\" sound heard when the heart beats. There are two types of heart murmurs:  · Innocent murmurs. Most people with this type of heart murmur do not have a heart problem. Many children have innocent heart murmurs. Your health care provider may suggest some basic testing to know whether your murmur is an innocent murmur. If an innocent heart murmur is found, there is no need for further tests or treatment and no need to restrict activities or stop playing sports.  · Abnormal murmurs. These types of murmurs can occur in children and adults. In children, abnormal heart murmurs are typically caused from heart defects that are present at birth (congenital). In adults, abnormal murmurs are usually from heart valve problems caused by disease, infection, or aging.  CAUSES   Normally, these valves open to let blood flow through or out of your heart and then shut to keep it from flowing backward. If they do not work properly, you could have:  · Regurgitation--When blood leaks back through the valve in the wrong direction.  · Mitral valve prolapse--When the mitral valve of the heart has a loose flap and does not close tightly.  · Stenosis--When the valve does not open enough and blocks blood flow.  SIGNS AND SYMPTOMS   Innocent murmurs do not cause symptoms, and many people with abnormal murmurs may or may not have symptoms. If symptoms do develop, they may include:  · Shortness of breath.  · Blue coloring of the skin, especially on the fingertips.  · Chest pain.  · Palpitations, or feeling a fluttering or skipped heartbeat.  · Fainting.  · Persistent cough.  · Getting tired much faster than expected.  DIAGNOSIS   A heart murmur might be heard during a sports physical or during any type of examination. When a murmur is heard, " it may suggest a possible problem. When this happens, your health care provider may ask you to see a heart specialist (cardiologist). You may also be asked to have one or more heart tests. In these cases, testing may vary depending on what your health care provider heard. Tests for a heart murmur may include:  · Electrocardiogram.  · Echocardiogram.  · MRI.  For children and adults who have an abnormal heart murmur and want to play sports, it is important to complete testing, review test results, and receive recommendations from your health care provider. If heart disease is present, it may not be safe to play.  TREATMENT   Innocent murmurs require no treatment or activity restriction. If an abnormal murmur represents a problem with the heart, treatment will depend on the exact nature of the problem. In these cases, medicine or surgery may be needed to treat the problem.  HOME CARE INSTRUCTIONS  If you want to participate in sports or other types of strenuous physical activity, it is important to discuss this first with your health care provider. If the murmur represents a problem with the heart and you choose to participate in sports, there is a small chance that a serious problem (including sudden death) could result.   SEEK MEDICAL CARE IF:   · You feel that your symptoms are slowly worsening.  · You develop any new symptoms that cause concern.  · You feel that you are having side effects from any medicines prescribed.  SEEK IMMEDIATE MEDICAL CARE IF:   · You develop chest pain.  · You have shortness of breath.  · You notice that your heart beats irregularly often enough to cause you to worry.  · You have fainting spells.  · Your symptoms suddenly get worse.  This information is not intended to replace advice given to you by your health care provider. Make sure you discuss any questions you have with your health care provider.

## 2019-03-29 ENCOUNTER — APPOINTMENT (OUTPATIENT)
Dept: CARDIOLOGY | Facility: MEDICAL CENTER | Age: 49
End: 2019-03-29
Attending: FAMILY MEDICINE
Payer: COMMERCIAL

## 2019-03-29 ENCOUNTER — OFFICE VISIT (OUTPATIENT)
Dept: MEDICAL GROUP | Facility: PHYSICIAN GROUP | Age: 49
End: 2019-03-29
Payer: COMMERCIAL

## 2019-03-29 VITALS
HEIGHT: 68 IN | WEIGHT: 145 LBS | BODY MASS INDEX: 21.98 KG/M2 | RESPIRATION RATE: 14 BRPM | SYSTOLIC BLOOD PRESSURE: 106 MMHG | DIASTOLIC BLOOD PRESSURE: 78 MMHG | HEART RATE: 78 BPM | OXYGEN SATURATION: 98 % | TEMPERATURE: 98.7 F

## 2019-03-29 DIAGNOSIS — R53.81 DEBILITY: ICD-10-CM

## 2019-03-29 DIAGNOSIS — R10.2 PELVIC PAIN: ICD-10-CM

## 2019-03-29 PROCEDURE — 99213 OFFICE O/P EST LOW 20 MIN: CPT | Performed by: INTERNAL MEDICINE

## 2019-03-29 NOTE — PROGRESS NOTES
PRIMARY CARE CLINIC ACUTE VISIT  Chief Complaint   Patient presents with   • Results     US      History of Present Illness     Pelvic pain  Was hospitalized 3/2019 for sepsis and since then started having bilateral lower abdominal pain. Prior to the septic shock she didn't have lower abdominal pain. Has a history of hysterectomy after she was done with her childbearing years but ovaries were left in place. Dr. Pop had ordered a pelvic ultrasound which does demonstrate a left ovarian cyst. After hospital discharge she had a bout of diarrhea and nausea. Her lower pelvic pain is constant, throbbing, aching. Her bowel habits have returned to baseline now, may have a day or two of constipation. Tries to eat fruit everyday to stay regular.     Debility  Since her ICU discharge for septic shock secondary to flu, Britt has been feeling weak and off balance. Even getting out of her car and walking into the office this morning was making her feel off.    Current Outpatient Prescriptions   Medication Sig Dispense Refill   • lisdexamfetamine (VYVANSE) 70 MG capsule Take 1 Cap by mouth every morning for 30 days. 30 Cap 0     No current facility-administered medications for this visit.      Past Medical History:   Diagnosis Date   • ADHD    • Cardiac murmur 3/27/2019   • Celiac disease    • Celiac disease 6/22/2018    Diagnosed by GI in [place] by official testing and endoscopy. Doing well with gluten free diet.   • Diabetes (HCC)    • GERD (gastroesophageal reflux disease)    • History of insulin resistance 2012   • Mild persistent asthma without complication 6/22/2018    Exercise induced asthma. Only with strenuous exercise. PRN Albuterol inhaler.    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    • Urinary tract infection    • Vitamin D deficiency 6/22/2018    Taking OTC.      Past Surgical History:   Procedure Laterality Date   •  "HYSTERECTOMY, VAGINAL      heavy menses   • PB ENLARGE BREAST WITH IMPLANT     • PB REDUCTION OF LARGE BREAST     • APPENDECTOMY     • ABDOMINAL HYSTERECTOMY TOTAL     • LUMPECTOMY      breast implants     Social History   Substance Use Topics   • Smoking status: Never Smoker   • Smokeless tobacco: Never Used   • Alcohol use Yes      Comment: Rarely     Social History     Social History Narrative   • No narrative on file     Family History   Problem Relation Age of Onset   • Other Mother         DVTs, IVC filter in place   • Diabetes Father    • Other Father         gout   • No Known Problems Sister    • Diabetes Brother    • Cancer Maternal Grandmother         skin cancer   • Breast Cancer Maternal Aunt    • Cancer Maternal Aunt         throat   • Heart Disease Paternal Grandfather         heart attack     Family Status   Relation Status   • Mo Alive, age 60y   • Fa Alive, age 60y   • Sis Alive   • Bro Alive   • MGMo    • MAunt    • PGFa  at age 65     Allergies: Gluten meal; Tylenol; and Ibuprofen    ROS  As per HPI above. All other systems reviewed and negative.        Objective   Blood pressure 106/78, pulse 78, temperature 37.1 °C (98.7 °F), resp. rate 14, height 1.734 m (5' 8.25\"), weight 65.8 kg (145 lb), SpO2 98 %, not currently breastfeeding. Body mass index is 21.89 kg/m².    General: alert and oriented, pleasant, cooperative  HEENT: Normocephalic, atraumatic.    Cardiovascular: regular rate and rhythm, normal S1/S2  Pulmonary: lungs clear to auscultation bilaterally  Gastrointestinal: bilateral lower pelvic tenderness to palpation. No hepatosplenomegaly. Bowel sounds normoactive  Lymphatics: no cervical or supraclavicular lymphadenopathy   Skin: warm and dry, no lesions or rashes  Psychiatric: appropriate mood and affect. Good insight and appropriate judgment     Assessment and Plan   The following treatment plan was discussed     1. Pelvic pain  Will follow up with " gynecologist regarding the ovarian cyst although I am not quite convinced that this is contributing to her pain. She has been quite inactive due to weakness since ICU discharge and would benefit from increased mobility with physical therapy.     2. Debility  - REFERRAL TO PHYSICAL THERAPY Reason for Therapy: Eval/Treat/Report      Healthcare maintenance     Health Maintenance Due   Topic Date Due   • DIABETES MONOFILAMENT / LE EXAM  04/20/1973   • RETINAL SCREENING  10/20/1990   • URINE ACR / MICROALBUMIN  10/20/1990   • IMM HEP B VACCINE (1 of 3 - Risk 3-dose series) 10/20/1991   • IMM PNEUMOCOCCAL 19-64 (ADULT) MEDIUM RISK SERIES (1 of 1 - PPSV23) 10/20/1991   • IMM INFLUENZA (1) 09/01/2018       Return in about 6 weeks (around 5/7/2019).    Jared Brower MD  Internal Medicine  [facility]

## 2019-03-29 NOTE — ASSESSMENT & PLAN NOTE
Since her ICU discharge for septic shock secondary to flu, Britt has been feeling weak and off balance. Even getting out of her car and walking into the office this morning was making her feel off.

## 2019-04-17 ENCOUNTER — APPOINTMENT (RX ONLY)
Dept: URBAN - METROPOLITAN AREA CLINIC 20 | Facility: CLINIC | Age: 49
Setting detail: DERMATOLOGY
End: 2019-04-17

## 2019-04-17 ENCOUNTER — OFFICE VISIT (OUTPATIENT)
Dept: INTERNAL MEDICINE | Facility: MEDICAL CENTER | Age: 49
End: 2019-04-17
Payer: COMMERCIAL

## 2019-04-17 VITALS
HEIGHT: 67 IN | HEART RATE: 98 BPM | OXYGEN SATURATION: 97 % | TEMPERATURE: 98 F | SYSTOLIC BLOOD PRESSURE: 110 MMHG | BODY MASS INDEX: 23.1 KG/M2 | WEIGHT: 147.2 LBS | DIASTOLIC BLOOD PRESSURE: 70 MMHG

## 2019-04-17 DIAGNOSIS — Z71.89 OTHER SPECIFIED COUNSELING: ICD-10-CM

## 2019-04-17 DIAGNOSIS — R73.03 PREDIABETES: ICD-10-CM

## 2019-04-17 DIAGNOSIS — L81.4 OTHER MELANIN HYPERPIGMENTATION: ICD-10-CM

## 2019-04-17 DIAGNOSIS — D18.0 HEMANGIOMA: ICD-10-CM

## 2019-04-17 DIAGNOSIS — L82.1 OTHER SEBORRHEIC KERATOSIS: ICD-10-CM

## 2019-04-17 DIAGNOSIS — D22 MELANOCYTIC NEVI: ICD-10-CM

## 2019-04-17 DIAGNOSIS — F90.9 ADULT ADHD (ATTENTION DEFICIT HYPERACTIVITY DISORDER): ICD-10-CM

## 2019-04-17 DIAGNOSIS — Z00.00 HEALTHCARE MAINTENANCE: ICD-10-CM

## 2019-04-17 PROBLEM — D22.62 MELANOCYTIC NEVI OF LEFT UPPER LIMB, INCLUDING SHOULDER: Status: ACTIVE | Noted: 2019-04-17

## 2019-04-17 PROBLEM — D22.5 MELANOCYTIC NEVI OF TRUNK: Status: ACTIVE | Noted: 2019-04-17

## 2019-04-17 PROBLEM — D48.5 NEOPLASM OF UNCERTAIN BEHAVIOR OF SKIN: Status: ACTIVE | Noted: 2019-04-17

## 2019-04-17 PROBLEM — D22.61 MELANOCYTIC NEVI OF RIGHT UPPER LIMB, INCLUDING SHOULDER: Status: ACTIVE | Noted: 2019-04-17

## 2019-04-17 PROBLEM — D18.01 HEMANGIOMA OF SKIN AND SUBCUTANEOUS TISSUE: Status: ACTIVE | Noted: 2019-04-17

## 2019-04-17 PROCEDURE — ? BIOPSY BY SHAVE METHOD

## 2019-04-17 PROCEDURE — ? COUNSELING

## 2019-04-17 PROCEDURE — 99214 OFFICE O/P EST MOD 30 MIN: CPT | Mod: 25

## 2019-04-17 PROCEDURE — 11102 TANGNTL BX SKIN SINGLE LES: CPT

## 2019-04-17 PROCEDURE — 99214 OFFICE O/P EST MOD 30 MIN: CPT | Mod: GC | Performed by: HOSPITALIST

## 2019-04-17 PROCEDURE — ? SUNSCREEN RECOMMENDATIONS

## 2019-04-17 RX ORDER — METFORMIN HYDROCHLORIDE 500 MG/1
500 TABLET, EXTENDED RELEASE ORAL DAILY
COMMUNITY
End: 2019-05-22 | Stop reason: SDUPTHER

## 2019-04-17 ASSESSMENT — LOCATION DETAILED DESCRIPTION DERM
LOCATION DETAILED: LEFT PROXIMAL DORSAL FOREARM
LOCATION DETAILED: SUPERIOR THORACIC SPINE
LOCATION DETAILED: RIGHT INFERIOR MEDIAL FOREHEAD
LOCATION DETAILED: RIGHT VENTRAL DISTAL FOREARM
LOCATION DETAILED: RIGHT RADIAL DORSAL HAND
LOCATION DETAILED: RIGHT MEDIAL UPPER BACK
LOCATION DETAILED: RIGHT INFERIOR UPPER BACK
LOCATION DETAILED: LEFT SUPRAPUBIC SKIN
LOCATION DETAILED: INFERIOR THORACIC SPINE
LOCATION DETAILED: RIGHT PROXIMAL DORSAL FOREARM
LOCATION DETAILED: LEFT VENTRAL PROXIMAL FOREARM
LOCATION DETAILED: LEFT RADIAL DORSAL HAND

## 2019-04-17 ASSESSMENT — LOCATION ZONE DERM
LOCATION ZONE: ARM
LOCATION ZONE: FACE
LOCATION ZONE: TRUNK
LOCATION ZONE: HAND

## 2019-04-17 ASSESSMENT — LOCATION SIMPLE DESCRIPTION DERM
LOCATION SIMPLE: UPPER BACK
LOCATION SIMPLE: RIGHT HAND
LOCATION SIMPLE: RIGHT FOREARM
LOCATION SIMPLE: RIGHT UPPER BACK
LOCATION SIMPLE: GROIN
LOCATION SIMPLE: LEFT FOREARM
LOCATION SIMPLE: LEFT HAND
LOCATION SIMPLE: RIGHT FOREHEAD

## 2019-04-17 NOTE — PROGRESS NOTES
New Patient to Establish    Reason to establish: New patient to establish    CC: Establish care    HPI: 48-year-old female with a past medical history of reported ADHD on Vyvanse, prediabetes on metformin and recent ICU discharge, months ago for sepsis.  Patient reporting pelvic pain for which she is scheduled to see OB/GYN on 5/31/19.  Patient reporting great level of anxiety secondary to being in the ICU.  Patient is constantly anxious regarding health condition which interrupts her daily work and ability to go to sleep.  Patient denies SI/HI.  Patient denies any chest pain, palpitation, dyspnea, dizziness, orthostatic changes lost consciousness.    Patient Active Problem List    Diagnosis Date Noted   • Mild persistent asthma without complication 06/22/2018     Priority: Medium   • Type 2 diabetes mellitus (HCC) 03/09/2019     Priority: Low   • Other hyperlipidemia 12/18/2018     Priority: Low   • Celiac disease 06/22/2018     Priority: Low   • Adult ADHD (attention deficit hyperactivity disorder) 03/16/2018     Priority: Low   • Debility 03/29/2019   • Hyperglycemia 03/27/2019   • Pelvic pain 03/27/2019   • Cardiac murmur 03/27/2019   • Changing skin lesion 09/18/2018   • Prediabetes 06/22/2018   • Healthcare maintenance 06/22/2018   • Vitamin D deficiency 06/22/2018   • PPD positive 06/22/2018       Past Medical History:   Diagnosis Date   • ADHD    • Cardiac murmur 3/27/2019   • Celiac disease    • Celiac disease 6/22/2018    Diagnosed by GI in [place] by official testing and endoscopy. Doing well with gluten free diet.   • Diabetes (HCC)    • GERD (gastroesophageal reflux disease)    • History of insulin resistance 2012   • Mild persistent asthma without complication 6/22/2018    Exercise induced asthma. Only with strenuous exercise. PRN Albuterol inhaler.    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    • Urinary tract  infection    • Vitamin D deficiency 6/22/2018    Taking OTC.        Current Outpatient Prescriptions   Medication Sig Dispense Refill   • metFORMIN ER (GLUCOPHAGE XR) 500 MG TABLET SR 24 HR Take 500 mg by mouth every day.     • lisdexamfetamine (VYVANSE) 70 MG capsule Take 1 Cap by mouth every morning for 30 days. 30 Cap 0     No current facility-administered medications for this visit.        Allergies as of 04/17/2019 - Reviewed 04/17/2019   Allergen Reaction Noted   • Gluten meal Unspecified 03/09/2019   • Tylenol Shortness of Breath 03/09/2019   • Ibuprofen  03/27/2019       Social History     Social History   • Marital status:      Spouse name: N/A   • Number of children: 3   • Years of education: N/A     Occupational History   • [employer]     Social History Main Topics   • Smoking status: Never Smoker   • Smokeless tobacco: Never Used   • Alcohol use Yes      Comment: Rarely   • Drug use: No   • Sexual activity: Not Currently     Partners: Male      Comment: Lives with her son. 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 No social or domestic concerns.     Other Topics Concern   • Not on file     Social History Narrative   • No narrative on file       Family History   Problem Relation Age of Onset   • Other Mother         DVTs, IVC filter in place   • Diabetes Father    • Other Father         gout   • No Known Problems Sister    • Diabetes Brother    • Cancer Maternal Grandmother         skin cancer   • Breast Cancer Maternal Aunt    • Cancer Maternal Aunt         throat   • Heart Disease Paternal Grandfather         heart attack       Past Surgical History:   Procedure Laterality Date   • HYSTERECTOMY, VAGINAL  2010    heavy menses   • PB ENLARGE BREAST WITH IMPLANT  2009   • PB REDUCTION OF LARGE BREAST  2009   • APPENDECTOMY  1999   • ABDOMINAL HYSTERECTOMY TOTAL     • LUMPECTOMY      breast implants       ROS: As per HPI. Additional pertinent symptoms as noted below.  Constitutional: no  "fevers, chills, weight change  Eyes: no blurred vision, discharge, eye pain  ENT: no rhinorrhea, sore throat, neck masses  Cardiovascular: no angina, palpitations, PND, orthopnea, edema  Respiratory: no cough, sputum, or dyspnea  GI: no nausea, vomiting, abdominal pain, constipation, or diarrhea  : no dysuria, hematuria, frequency   Musculo-skeletal: no joint or muscle pain  Skin: no rashes or open wounds  Neurological: no headaches, dizziness, motor/sensory loss  Psychological: no anxiety or depression      /70 (BP Location: Left arm, Patient Position: Sitting, BP Cuff Size: Adult)   Pulse 98   Temp 36.7 °C (98 °F) (Temporal)   Ht 1.702 m (5' 7\")   Wt 66.8 kg (147 lb 3.2 oz)   SpO2 97%   BMI 23.05 kg/m²     Physical Exam  General:  Alert and oriented, No apparent distress.  Eyes: Pupils equal and reactive. No scleral icterus.  Throat: Clear no erythema or exudates noted.  Neck: Supple. No lymphadenopathy noted. Thyroid not enlarged.  Lungs: Clear to auscultation and percussion bilaterally.  Cardiovascular: RRR, Second left intercostal space systolic murmur, rubs or gallops.  Abdomen:  Benign. No rebound or guarding noted.  Extremities: No clubbing, cyanosis, edema.  Skin: Clear. No rash or suspicious skin lesions noted.      Note: I have reviewed all pertinent labs and diagnostic tests associated with this visit with specific comments listed under the assessment and plan below    Assessment and Plan    1. Prediabetes  History of prediabetes  Pending A1c  Follow-up in 10 weeks    2. Adult ADHD (attention deficit hyperactivity disorder)  Patient requesting refill for Vyvanse  Noted for systolic murmur  Pending echocardiogram, ordered by prior physician  Patient reported that she did not want continue with study secondary to cost  Referral to psychiatry  Follow-up in 10 weeks    3. Healthcare maintenance  Pending CBC  Pending CMP  Pending lipid panel  Follow-up in 10 weeks      Signed by: Alberto BARRIENTOS" Maddie Galdamez M.D.

## 2019-04-17 NOTE — PROCEDURE: BIOPSY BY SHAVE METHOD
X Size Of Lesion In Cm: 0
Depth Of Biopsy: dermis
Bill [id number] For Specimen Handling/Conveyance To Laboratory?: no
Billing Type: Third-Party Bill
Type Of Destruction Used: Curettage
Biopsy Type: H and E
Anesthesia Volume In Cc: 1
Was A Bandage Applied: Yes
Post-Care Instructions: I reviewed with the patient in detail post-care instructions. Patient is to keep the biopsy site clean once daily and then apply petroleum and bandaid  until healed.
Dressing: Band-Aid
Notification Instructions: Patient will be notified of biopsy results. However, patient instructed to call the office if not contacted within 2 weeks.
Biopsy Method: Personna blade
Hemostasis: Drysol and Electrocautery
Detail Level: Detailed
Consent: Written consent was obtained and risks were reviewed including but not limited to scarring, infection, bleeding, scabbing, incomplete removal, nerve damage and allergy to anesthesia.
Lab: [id number]
Wound Care: Bacitracin
Lab Facility: 
Anesthesia Type: 1% lidocaine with 1:100,000 epinephrine and a 1:12 solution of 8.4% sodium bicarbonate

## 2019-04-24 ENCOUNTER — HOSPITAL ENCOUNTER (OUTPATIENT)
Dept: LAB | Facility: MEDICAL CENTER | Age: 49
End: 2019-04-24
Attending: STUDENT IN AN ORGANIZED HEALTH CARE EDUCATION/TRAINING PROGRAM
Payer: COMMERCIAL

## 2019-04-24 DIAGNOSIS — Z00.00 HEALTHCARE MAINTENANCE: ICD-10-CM

## 2019-04-24 DIAGNOSIS — R73.03 PREDIABETES: ICD-10-CM

## 2019-04-24 LAB
ALBUMIN SERPL BCP-MCNC: 3.8 G/DL (ref 3.2–4.9)
ALBUMIN/GLOB SERPL: 1.2 G/DL
ALP SERPL-CCNC: 69 U/L (ref 30–99)
ALT SERPL-CCNC: 17 U/L (ref 2–50)
ANION GAP SERPL CALC-SCNC: 5 MMOL/L (ref 0–11.9)
AST SERPL-CCNC: 15 U/L (ref 12–45)
BASOPHILS # BLD AUTO: 1.2 % (ref 0–1.8)
BASOPHILS # BLD: 0.06 K/UL (ref 0–0.12)
BILIRUB SERPL-MCNC: 0.4 MG/DL (ref 0.1–1.5)
BUN SERPL-MCNC: 10 MG/DL (ref 8–22)
CALCIUM SERPL-MCNC: 9.1 MG/DL (ref 8.5–10.5)
CHLORIDE SERPL-SCNC: 107 MMOL/L (ref 96–112)
CHOLEST SERPL-MCNC: 210 MG/DL (ref 100–199)
CO2 SERPL-SCNC: 24 MMOL/L (ref 20–33)
CREAT SERPL-MCNC: 0.64 MG/DL (ref 0.5–1.4)
EOSINOPHIL # BLD AUTO: 0.19 K/UL (ref 0–0.51)
EOSINOPHIL NFR BLD: 3.7 % (ref 0–6.9)
ERYTHROCYTE [DISTWIDTH] IN BLOOD BY AUTOMATED COUNT: 47.9 FL (ref 35.9–50)
EST. AVERAGE GLUCOSE BLD GHB EST-MCNC: 111 MG/DL
FASTING STATUS PATIENT QL REPORTED: NORMAL
GLOBULIN SER CALC-MCNC: 3.2 G/DL (ref 1.9–3.5)
GLUCOSE SERPL-MCNC: 53 MG/DL (ref 65–99)
HBA1C MFR BLD: 5.5 % (ref 0–5.6)
HCT VFR BLD AUTO: 44.6 % (ref 37–47)
HDLC SERPL-MCNC: 58 MG/DL
HGB BLD-MCNC: 14.3 G/DL (ref 12–16)
IMM GRANULOCYTES # BLD AUTO: 0.01 K/UL (ref 0–0.11)
IMM GRANULOCYTES NFR BLD AUTO: 0.2 % (ref 0–0.9)
LDLC SERPL CALC-MCNC: 136 MG/DL
LYMPHOCYTES # BLD AUTO: 2.21 K/UL (ref 1–4.8)
LYMPHOCYTES NFR BLD: 42.8 % (ref 22–41)
MCH RBC QN AUTO: 30.8 PG (ref 27–33)
MCHC RBC AUTO-ENTMCNC: 32.1 G/DL (ref 33.6–35)
MCV RBC AUTO: 96.1 FL (ref 81.4–97.8)
MONOCYTES # BLD AUTO: 0.32 K/UL (ref 0–0.85)
MONOCYTES NFR BLD AUTO: 6.2 % (ref 0–13.4)
NEUTROPHILS # BLD AUTO: 2.37 K/UL (ref 2–7.15)
NEUTROPHILS NFR BLD: 45.9 % (ref 44–72)
NRBC # BLD AUTO: 0 K/UL
NRBC BLD-RTO: 0 /100 WBC
PLATELET # BLD AUTO: 268 K/UL (ref 164–446)
PMV BLD AUTO: 11 FL (ref 9–12.9)
POTASSIUM SERPL-SCNC: 3.8 MMOL/L (ref 3.6–5.5)
PROT SERPL-MCNC: 7 G/DL (ref 6–8.2)
RBC # BLD AUTO: 4.64 M/UL (ref 4.2–5.4)
SODIUM SERPL-SCNC: 136 MMOL/L (ref 135–145)
TRIGL SERPL-MCNC: 78 MG/DL (ref 0–149)
WBC # BLD AUTO: 5.2 K/UL (ref 4.8–10.8)

## 2019-04-24 PROCEDURE — 85025 COMPLETE CBC W/AUTO DIFF WBC: CPT

## 2019-04-24 PROCEDURE — 80053 COMPREHEN METABOLIC PANEL: CPT

## 2019-04-24 PROCEDURE — 80061 LIPID PANEL: CPT

## 2019-04-24 PROCEDURE — 36415 COLL VENOUS BLD VENIPUNCTURE: CPT

## 2019-04-24 PROCEDURE — 83036 HEMOGLOBIN GLYCOSYLATED A1C: CPT

## 2019-04-26 ENCOUNTER — TELEPHONE (OUTPATIENT)
Dept: INTERNAL MEDICINE | Facility: MEDICAL CENTER | Age: 49
End: 2019-04-26

## 2019-04-26 DIAGNOSIS — R73.03 PREDIABETES: ICD-10-CM

## 2019-04-26 NOTE — TELEPHONE ENCOUNTER
1. Caller Name: PT                      Call Back Number: [phone] (home)     2. Message: Patient called stating she has a question on her labs, as her glucose shows that it is really low.    3. Patient approves office to leave a detailed voicemail/MyChart message: N\A    Sending patient mychart message to notify her that I am sending the message to Dr. Perkins

## 2019-05-02 ENCOUNTER — TELEPHONE (OUTPATIENT)
Dept: INTERNAL MEDICINE | Facility: MEDICAL CENTER | Age: 49
End: 2019-05-02

## 2019-05-02 NOTE — TELEPHONE ENCOUNTER
Ordered repeat BMP. Place have the patient schedule an appointment to be seen as soon as possible. Thank you.

## 2019-05-02 NOTE — TELEPHONE ENCOUNTER
A message was taken from the front office from daughter that called upset. I called daughter back and she went on a rant that we are not taking her mother's health serious and we just want her to go back to the hospital with septic shock and die. She states mother hasn't been feeling well and no one has called her to check up on her. She would like for us to give her calls and check how she is doing as she lives alone and family doesn't live close by. After she let me know all her concerns I explained that we first saw patient as a completely new patient to our office this past month so all that information is new to us and it looked like she use to be following up with Dr. Pop, so I told her that we got her labs back and Dr. Perkins took a look at them just yesterday and everything looks good. She then cut me off and states that her mom sent screen shots of labs and everything looks bad. I told her the labs that came back to us were on 04/26 and everything came back normal and we placed new orders for labs as well to check on her glucose as that is what was she was stating she was worried about. After 10 minutes of talking on the phone and explaining how our clinic worked and labs daughter calmed down and states she might end up going to another office to keep a closer eye on mom's health and I explained that is fine we won't force anyone to stay with us if they do not wish to. She also asked for me to call her mom as she was not feeling well. I called Britt and she explained to me that she has been having body aches and sore throat. I asked if she would like to get seen in clinic with a collegue of Dr. Perkins and she agreed so I went ahead and scheduled her on Wednesday 05/08

## 2019-05-03 ENCOUNTER — OFFICE VISIT (OUTPATIENT)
Dept: URGENT CARE | Facility: PHYSICIAN GROUP | Age: 49
End: 2019-05-03
Payer: COMMERCIAL

## 2019-05-03 VITALS
RESPIRATION RATE: 24 BRPM | SYSTOLIC BLOOD PRESSURE: 106 MMHG | HEART RATE: 110 BPM | HEIGHT: 67 IN | WEIGHT: 147 LBS | OXYGEN SATURATION: 100 % | DIASTOLIC BLOOD PRESSURE: 78 MMHG | TEMPERATURE: 99.1 F | BODY MASS INDEX: 23.07 KG/M2

## 2019-05-03 DIAGNOSIS — J02.0 PHARYNGITIS DUE TO STREPTOCOCCUS SPECIES: ICD-10-CM

## 2019-05-03 LAB
FLUAV+FLUBV AG SPEC QL IA: NEGATIVE
INT CON NEG: NEGATIVE
INT CON NEG: NEGATIVE
INT CON POS: POSITIVE
INT CON POS: POSITIVE
S PYO AG THROAT QL: POSITIVE

## 2019-05-03 PROCEDURE — 87880 STREP A ASSAY W/OPTIC: CPT | Performed by: FAMILY MEDICINE

## 2019-05-03 PROCEDURE — 99214 OFFICE O/P EST MOD 30 MIN: CPT | Performed by: FAMILY MEDICINE

## 2019-05-03 PROCEDURE — 87804 INFLUENZA ASSAY W/OPTIC: CPT | Performed by: FAMILY MEDICINE

## 2019-05-03 RX ORDER — DOXYCYCLINE HYCLATE 100 MG
100 TABLET ORAL 2 TIMES DAILY
Qty: 20 TAB | Refills: 0 | Status: SHIPPED | OUTPATIENT
Start: 2019-05-03 | End: 2019-05-13

## 2019-05-03 ASSESSMENT — ENCOUNTER SYMPTOMS
WHEEZING: 0
FEVER: 1
MYALGIAS: 1
SORE THROAT: 1
CHILLS: 1
SHORTNESS OF BREATH: 0
COUGH: 1

## 2019-05-03 NOTE — PROGRESS NOTES
"Subjective:   Britt Subramanian is a 48 y.o. female who presents for Cough (w/ congestion, sore throat, ear pain, body aces x 1 day )        Cough   This is a new problem. The current episode started yesterday. The problem has been gradually worsening. The problem occurs every few minutes. The cough is non-productive. Associated symptoms include chills, a fever, myalgias and a sore throat. Pertinent negatives include no nasal congestion, postnasal drip, shortness of breath or wheezing.     Review of Systems   Constitutional: Positive for chills and fever.   HENT: Positive for sore throat. Negative for postnasal drip.    Respiratory: Positive for cough. Negative for shortness of breath and wheezing.    Musculoskeletal: Positive for myalgias.     Allergies   Allergen Reactions   • Gluten Meal Unspecified     Pt has celiac    • Tylenol Shortness of Breath     Pt states, \"it makes me wheeze\"   • Ibuprofen       Objective:   /78 (BP Location: Right arm, Patient Position: Sitting, BP Cuff Size: Adult)   Pulse (!) 110   Temp 37.3 °C (99.1 °F) (Temporal)   Resp (!) 24   Ht 1.702 m (5' 7\")   Wt 66.7 kg (147 lb)   SpO2 100%   BMI 23.02 kg/m²   Physical Exam   Constitutional: She is oriented to person, place, and time. She appears well-developed and well-nourished. No distress.   HENT:   Head: Normocephalic and atraumatic.   Mouth/Throat: Uvula is midline. Posterior oropharyngeal edema and posterior oropharyngeal erythema present. No tonsillar abscesses.   Eyes: Pupils are equal, round, and reactive to light. Conjunctivae and EOM are normal.   Cardiovascular: Normal rate and regular rhythm.    No murmur heard.  Pulmonary/Chest: Effort normal and breath sounds normal. No respiratory distress. She has no wheezes. She has no rales.   Abdominal: Soft. She exhibits no distension. There is no tenderness.   Neurological: She is alert and oriented to person, place, and time. She has normal reflexes. No sensory deficit. "   Skin: Skin is warm and dry.   Psychiatric: She has a normal mood and affect.         Assessment/Plan:   1. Pharyngitis due to Streptococcus species  - doxycycline (VIBRAMYCIN) 100 MG Tab; Take 1 Tab by mouth 2 times a day for 10 days.  Dispense: 20 Tab; Refill: 0    Differential diagnosis, natural history, supportive care, and indications for immediate follow-up discussed.

## 2019-05-07 ENCOUNTER — OFFICE VISIT (OUTPATIENT)
Dept: MEDICAL GROUP | Facility: PHYSICIAN GROUP | Age: 49
End: 2019-05-07
Payer: COMMERCIAL

## 2019-05-07 VITALS
HEART RATE: 90 BPM | WEIGHT: 148 LBS | DIASTOLIC BLOOD PRESSURE: 74 MMHG | RESPIRATION RATE: 16 BRPM | BODY MASS INDEX: 23.23 KG/M2 | OXYGEN SATURATION: 99 % | TEMPERATURE: 98.4 F | SYSTOLIC BLOOD PRESSURE: 110 MMHG | HEIGHT: 67 IN

## 2019-05-07 DIAGNOSIS — F90.9 ADULT ADHD (ATTENTION DEFICIT HYPERACTIVITY DISORDER): ICD-10-CM

## 2019-05-07 PROCEDURE — 99213 OFFICE O/P EST LOW 20 MIN: CPT | Performed by: INTERNAL MEDICINE

## 2019-05-07 RX ORDER — LISDEXAMFETAMINE DIMESYLATE CAPSULES 70 MG/1
70 CAPSULE ORAL EVERY MORNING
Qty: 30 CAP | Refills: 0 | Status: SHIPPED | OUTPATIENT
Start: 2019-07-11 | End: 2019-07-26 | Stop reason: SDUPTHER

## 2019-05-07 RX ORDER — LISDEXAMFETAMINE DIMESYLATE CAPSULES 70 MG/1
70 CAPSULE ORAL EVERY MORNING
Qty: 30 CAP | Refills: 2 | Status: SHIPPED | OUTPATIENT
Start: 2019-05-11 | End: 2019-05-07 | Stop reason: CLARIF

## 2019-05-07 RX ORDER — LISDEXAMFETAMINE DIMESYLATE CAPSULES 70 MG/1
70 CAPSULE ORAL EVERY MORNING
Qty: 30 CAP | Refills: 0 | Status: SHIPPED | OUTPATIENT
Start: 2019-05-11 | End: 2019-06-10

## 2019-05-07 RX ORDER — LISDEXAMFETAMINE DIMESYLATE CAPSULES 70 MG/1
70 CAPSULE ORAL EVERY MORNING
Qty: 30 CAP | Refills: 0 | Status: SHIPPED | OUTPATIENT
Start: 2019-06-11 | End: 2019-07-11

## 2019-05-07 NOTE — ASSESSMENT & PLAN NOTE
She has been on vyanse 70 mg and the other primary care providers have told her that they will not fill this medication for her. Her prior PCP Dr. Tovar was filling this medication.

## 2019-05-08 ENCOUNTER — APPOINTMENT (OUTPATIENT)
Dept: INTERNAL MEDICINE | Facility: MEDICAL CENTER | Age: 49
End: 2019-05-08
Payer: COMMERCIAL

## 2019-05-14 DIAGNOSIS — R73.03 PREDIABETES: ICD-10-CM

## 2019-05-15 ENCOUNTER — APPOINTMENT (OUTPATIENT)
Dept: PHYSICAL THERAPY | Facility: REHABILITATION | Age: 49
End: 2019-05-15
Attending: INTERNAL MEDICINE
Payer: COMMERCIAL

## 2019-05-20 RX ORDER — METFORMIN HYDROCHLORIDE 500 MG/1
TABLET, EXTENDED RELEASE ORAL
Qty: 90 TAB | Refills: 6 | OUTPATIENT
Start: 2019-05-20

## 2019-05-20 NOTE — TELEPHONE ENCOUNTER
Patients A1C is not suggestive of DM or pre-DM. She had a history of hypoglycemia on labs for which a repeat BMP is pending. Even though metformin is known for not causing hypoglycemia, I am concern it my be contributing. Thus, I would like to hold on metformin for now and have the patient schedule an appointment to evaluate her hypoglycemia. Thank you.

## 2019-05-22 ENCOUNTER — OFFICE VISIT (OUTPATIENT)
Dept: INTERNAL MEDICINE | Facility: MEDICAL CENTER | Age: 49
End: 2019-05-22
Payer: COMMERCIAL

## 2019-05-22 VITALS
WEIGHT: 147.2 LBS | DIASTOLIC BLOOD PRESSURE: 65 MMHG | HEART RATE: 96 BPM | SYSTOLIC BLOOD PRESSURE: 109 MMHG | TEMPERATURE: 98.4 F | BODY MASS INDEX: 23.1 KG/M2 | OXYGEN SATURATION: 95 % | HEIGHT: 67 IN

## 2019-05-22 DIAGNOSIS — E78.5 DYSLIPIDEMIA: ICD-10-CM

## 2019-05-22 DIAGNOSIS — R73.03 PREDIABETES: ICD-10-CM

## 2019-05-22 PROCEDURE — 99213 OFFICE O/P EST LOW 20 MIN: CPT | Mod: GE | Performed by: INTERNAL MEDICINE

## 2019-05-22 RX ORDER — METFORMIN HYDROCHLORIDE 500 MG/1
500 TABLET, EXTENDED RELEASE ORAL DAILY
Qty: 90 TAB | Refills: 1 | Status: SHIPPED | OUTPATIENT
Start: 2019-05-22

## 2019-05-22 NOTE — PROGRESS NOTES
Established Patient    Britt presents today with the following:    CC: prediabetes and dyslipidemia    HPI:   48-year-old pleasant female with past medical history of adult ADHD, prediabetes, dyslipidemia, celiac disease, asthma presents to follow-up on prediabetes and dyslipidemia.    Patient has never had diagnosis of DM2. Was started on Metformin about 5 years ago for prediabetes and strong family history of DM. Patient has since been on this medication and is very reluctant to come off of it.   She recently had a BMP on 4/24/2019 that showed glucose of 53, but patient denies symptoms of hypoglycemia or previous h/o low blood sugars (she does check her BS occasionally).     Patient Active Problem List    Diagnosis Date Noted   • Mild persistent asthma without complication 06/22/2018     Priority: Medium   • Other hyperlipidemia 12/18/2018     Priority: Low   • Celiac disease 06/22/2018     Priority: Low   • Adult ADHD (attention deficit hyperactivity disorder) 03/16/2018     Priority: Low   • Dyslipidemia 05/22/2019   • Debility 03/29/2019   • Hyperglycemia 03/27/2019   • Pelvic pain 03/27/2019   • Cardiac murmur 03/27/2019   • Changing skin lesion 09/18/2018   • Prediabetes 06/22/2018   • Vitamin D deficiency 06/22/2018   • PPD positive 06/22/2018       Current Outpatient Prescriptions   Medication Sig Dispense Refill   • metFORMIN ER (GLUCOPHAGE XR) 500 MG TABLET SR 24 HR Take 1 Tab by mouth every day. 90 Tab 1   • [START ON 7/11/2019] lisdexamfetamine (VYVANSE) 70 MG capsule Take 1 Cap by mouth every morning for 30 days. 30 Cap 0   • lisdexamfetamine (VYVANSE) 70 MG capsule Take 1 Cap by mouth every morning for 30 days. 30 Cap 0   • [START ON 6/11/2019] lisdexamfetamine (VYVANSE) 70 MG capsule Take 1 Cap by mouth every morning for 30 days. 30 Cap 0     No current facility-administered medications for this visit.        ROS: As per HPI. Additional pertinent systems as noted below.  Constitutional:  "Negative for chills and fever.   HENT: Negative for ear pain and sore throat.    Eyes: Negative for discharge and redness.   Respiratory: Negative for cough, hemoptysis, wheezing and stridor.    Cardiovascular: Negative for chest pain, palpitations and leg swelling.   Gastrointestinal: Negative for abdominal pain, constipation, diarrhea, heartburn, nausea and vomiting.   Genitourinary: Negative for dysuria, flank pain and hematuria.   Musculoskeletal: Negative for falls and myalgias.   Skin: Negative for itching and rash.   Neurological: Negative for dizziness, seizures, loss of consciousness and headaches.   Endo/Heme/Allergies: Negative for polydipsia. Does not bruise/bleed easily.   Psychiatric/Behavioral: Negative for substance abuse and suicidal ideas.       /65 (BP Location: Left arm, Patient Position: Sitting)   Pulse 96   Temp 36.9 °C (98.4 °F) (Temporal)   Ht 1.702 m (5' 7\")   Wt 66.8 kg (147 lb 3.2 oz)   SpO2 95%   BMI 23.05 kg/m²     Physical Exam   Constitutional:  oriented to person, place, and time. No distress.   Eyes: Pupils are equal, round, and reactive to light. No scleral icterus.  Neck: Neck supple. No thyromegaly present.   Cardiovascular: Normal rate, regular rhythm and normal heart sounds.  Exam reveals no gallop and no friction rub.  No murmur heard.  Pulmonary/Chest: Breath sounds normal with no wheezing or crackles.   Musculoskeletal:   no edema.   Lymphadenopathy: no cervical adenopathy  Neurological: alert and oriented to person, place, and time.   Skin: No cyanosis. Nails show no clubbing.    Note: I have reviewed all pertinent labs and diagnostic tests associated with this visit with specific comments listed under the assessment and plan below    Assessment and Plan    1. Prediabetes  Controlled on metformin 500mg daily. Most recently A1c on 4/24/2019 actually normalized to 5.5% (prior A1c's have been 5.7% over the past year). Bmp on 4/24/2019 also showed fasting blood " sugars of 53. However patient denies symptoms of hypoglycemia or previous history of low blood sugars.   Discussed benefits and risks of ongoing metformin use. Patient does not want to stop the metformin at this time at all, and does not want to entertain the idea whatsoever. Will monitor for now.   Metformin typically does not result in hypoglycemia. However, with this patient that now has normalized A1c, dietary carb changes, could have contributed to hypoglycemia?   Discussed symptoms and checking blood sugars when she feels unwell. If she has low blood sugar values < 60, patient is instructed to discontinue metformin use and contact clinic. Encouraged to ingest simple sugars/carbs when she has low BS.   Recheck A1c in about 3-6 months, if continues to be in normal range or further decreases, may be appropriate to more seriously discuss discontinuation of metformin and pursue that route.   -     metFORMIN ER (GLUCOPHAGE XR) 500 MG TABLET SR 24 HR; Take 1 Tab by mouth every day.    2. Dyslipidemia  Dietary modification recommended. No medication indicated.   The 10-year ASCVD risk score (Bromide ANGELA Jr., et al., 2013) is: 1.2%    Values used to calculate the score:      Age: 48 years      Sex: Female      Is Non- : No      Diabetic: Yes      Tobacco smoker: No      Systolic Blood Pressure: 109 mmHg      Is BP treated: No      HDL Cholesterol: 58 mg/dL      Total Cholesterol: 210 mg/dL      Followup: Return for regular visit with PCP.      Signed by: Bipin Martínez M.D.

## 2019-05-31 ENCOUNTER — HOSPITAL ENCOUNTER (OUTPATIENT)
Facility: MEDICAL CENTER | Age: 49
End: 2019-05-31
Attending: OBSTETRICS & GYNECOLOGY
Payer: COMMERCIAL

## 2019-05-31 ENCOUNTER — GYNECOLOGY VISIT (OUTPATIENT)
Dept: OBGYN | Facility: CLINIC | Age: 49
End: 2019-05-31
Payer: COMMERCIAL

## 2019-05-31 VITALS — SYSTOLIC BLOOD PRESSURE: 110 MMHG | WEIGHT: 149 LBS | DIASTOLIC BLOOD PRESSURE: 68 MMHG | BODY MASS INDEX: 23.34 KG/M2

## 2019-05-31 DIAGNOSIS — Z11.3 SCREEN FOR STD (SEXUALLY TRANSMITTED DISEASE): ICD-10-CM

## 2019-05-31 DIAGNOSIS — N83.202 LEFT OVARIAN CYST: ICD-10-CM

## 2019-05-31 LAB
C TRACH DNA SPEC QL NAA+PROBE: NEGATIVE
N GONORRHOEA DNA SPEC QL NAA+PROBE: NEGATIVE
SPECIMEN SOURCE: NORMAL

## 2019-05-31 PROCEDURE — 87591 N.GONORRHOEAE DNA AMP PROB: CPT

## 2019-05-31 PROCEDURE — 99203 OFFICE O/P NEW LOW 30 MIN: CPT | Performed by: OBSTETRICS & GYNECOLOGY

## 2019-05-31 PROCEDURE — 87491 CHLMYD TRACH DNA AMP PROBE: CPT

## 2019-05-31 NOTE — PROGRESS NOTES
"GYN Consult    CC/reason for consult: ovarian cyst    HPI: Britt Subramanian is a 48 y.o.  with A recent history and March of being hospitalized for septic shock.  They ruled it to be secondary to influenza A.  Her blood cultures were negative however she did require ICU admission with a central line placement and pressors.  After hospitalization she noted left-sided pelvic pain.  An ultrasound was performed that demonstrated a 3.3 x 2.04 x 2.03 cm left ovarian cyst.  She had a hysterectomy several years ago for heavy menstrual bleeding.  She is not currently experiencing any hot flashes or night sweats.  Denies any current vaginal discharge fevers or chills.  She states that she feels the pain \"sometimes\".  She is not currently sexually active so she does not know if she has any pain with intercourse.  They did not do a gonorrhea or chlamydia test in March on her admission for septic shock.  She is current on her Pap smear and has no history of abnormal Pap smears.      ROS:  constitutional: denies fevers, general concerns  CV: denies chest pain, palpitations, edema  Resp: denies shortness of breath, cough  GI: denies abd pain, N/V, diarrhea/constipation, blood in stool  : denies irregular vaginal bleeding, discharge, denies urinary complaints  Neuro: denies hx of migraines w/ aura  Endo: denies significant weight changes, temperature intolerance, denies hotflashes/nightsweats  Heme/lymph: denies easy bleeding/bruising, denies swollen glands  Psych: denies concerns about mood, denies SI  Allergy: denies concerns        OB history:        OB History    Para Term  AB Living   5 2 2 0 3 2   SAB TAB Ectopic Molar Multiple Live Births   3         2      # Outcome Date GA Lbr All/2nd Weight Sex Delivery Anes PTL Lv   5 2000 16w0d       FD   4 1999           3 1998           2 Term 08/10/97 40w0d   F Vag-Spont   BRYSON   1 Term 93 40w0d   F Vag-Spont   BRYSNO          Past Medical " History:   Diagnosis Date   • ADHD    • Cardiac murmur 3/27/2019   • Celiac disease    • Celiac disease 6/22/2018    Diagnosed by GI in [place] by official testing and endoscopy. Doing well with gluten free diet.   • Diabetes (HCC)    • GERD (gastroesophageal reflux disease)    • History of insulin resistance 2012   • Mild persistent asthma without complication 6/22/2018    Exercise induced asthma. Only with strenuous exercise. PRN Albuterol inhaler.    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    • Urinary tract infection    • Vitamin D deficiency 6/22/2018    Taking OTC.        Past Surgical History:   Procedure Laterality Date   • HYSTERECTOMY, VAGINAL  2010    heavy menses   • PB ENLARGE BREAST WITH IMPLANT  2009   • PB REDUCTION OF LARGE BREAST  2009   • APPENDECTOMY  1999   • ABDOMINAL HYSTERECTOMY TOTAL     • LUMPECTOMY      breast implants       Medications:   Current Outpatient Prescriptions Ordered in [facility]   Medication Sig Dispense Refill   • metFORMIN ER (GLUCOPHAGE XR) 500 MG TABLET SR 24 HR Take 1 Tab by mouth every day. 90 Tab 1   • [START ON 7/11/2019] lisdexamfetamine (VYVANSE) 70 MG capsule Take 1 Cap by mouth every morning for 30 days. 30 Cap 0   • lisdexamfetamine (VYVANSE) 70 MG capsule Take 1 Cap by mouth every morning for 30 days. (Patient not taking: Reported on 5/31/2019) 30 Cap 0   • [START ON 6/11/2019] lisdexamfetamine (VYVANSE) 70 MG capsule Take 1 Cap by mouth every morning for 30 days. (Patient not taking: Reported on 5/31/2019) 30 Cap 0     No current Epic-ordered facility-administered medications on file.        Allergies: Gluten meal; Tylenol; and Ibuprofen    Social History     Social History   • Marital status:      Spouse name: N/A   • Number of children: 3   • Years of education: N/A     Occupational History   • [employer]     Social History Main Topics   • Smoking  status: Never Smoker   • Smokeless tobacco: Never Used   • Alcohol use Yes      Comment: occasional   • Drug use: No   • Sexual activity: Not Currently     Partners: Male      Comment: Lives with her son. Works full time. [employer]. No social or domestic concerns.     Other Topics Concern   • Not on file     Social History Narrative   • No narrative on file       Family History   Problem Relation Age of Onset   • Other Mother         DVTs, IVC filter in place   • Diabetes Father    • Other Father         gout   • No Known Problems Sister    • Diabetes Brother    • Cancer Maternal Grandmother         skin cancer   • Breast Cancer Maternal Aunt    • Cancer Maternal Aunt         throat   • Heart Disease Paternal Grandfather         heart attack     Aunt had breast cancer    Physical Exam:  /68 (BP Location: Left arm)   Wt 67.6 kg (149 lb)   BMI 23.34 kg/m²   gen: AAO, NAD, affect appropriate  Neck: non-tender, no masses, no thyromegaly/nodules appreciated  CV: RRR; no LE edema  Resp: Symmetric non labored breathing, CTAB  Breast: symmetric, no skin changes, no masses, nontender, no nipple discharge, no lymphadenopathy  Abd: soft, NT, ND, no masses, no organomegaly, no hernias  : NEFG, normal urethral meatus, normal anus/perineum, normal vagina. no adnexal masses/tenderness  Skin: warm/dry, no lesions    A/P: 48 y.o.  with   1. Left ovarian cyst  US-PELVIC COMPLETE (TRANSABDOMINAL/TRANSVAGINAL) (COMBO)    CHLAMYDIA/GC PCR URINE OR SWAB   2. Screen for STD (sexually transmitted disease)  CHLAMYDIA/GC PCR URINE OR SWAB     FU in one month to discuss US results.

## 2019-05-31 NOTE — NON-PROVIDER
Pt is here for a new pt appointment.  She had some imaging done and would like to go over the results

## 2019-06-19 ENCOUNTER — HOSPITAL ENCOUNTER (OUTPATIENT)
Dept: RADIOLOGY | Facility: MEDICAL CENTER | Age: 49
End: 2019-06-19
Attending: OBSTETRICS & GYNECOLOGY
Payer: COMMERCIAL

## 2019-06-19 DIAGNOSIS — N83.202 LEFT OVARIAN CYST: ICD-10-CM

## 2019-06-19 PROCEDURE — 76830 TRANSVAGINAL US NON-OB: CPT

## 2019-06-19 NOTE — PROGRESS NOTES
Sent in [facility] Britt,   The cyst has resolved! It was just a physiologic cyst.   Thanks,   Rupali Plata

## 2019-06-25 ENCOUNTER — TELEPHONE (OUTPATIENT)
Dept: OBGYN | Facility: CLINIC | Age: 49
End: 2019-06-25

## 2019-06-25 NOTE — TELEPHONE ENCOUNTER
Pt called to see if she really needs to be seen today.  appt was made from her last visit to f/u u/s results.  Per pt she got results via my chart and was resolved, has not issues either. Okay to cancel today's appt.

## 2019-07-03 ENCOUNTER — OFFICE VISIT (OUTPATIENT)
Dept: URGENT CARE | Facility: PHYSICIAN GROUP | Age: 49
End: 2019-07-03
Payer: COMMERCIAL

## 2019-07-03 VITALS
HEIGHT: 68 IN | SYSTOLIC BLOOD PRESSURE: 122 MMHG | DIASTOLIC BLOOD PRESSURE: 82 MMHG | WEIGHT: 148 LBS | BODY MASS INDEX: 22.43 KG/M2 | RESPIRATION RATE: 20 BRPM | TEMPERATURE: 98.3 F | OXYGEN SATURATION: 99 % | HEART RATE: 85 BPM

## 2019-07-03 DIAGNOSIS — Z71.1 PHYSICALLY WELL BUT WORRIED: ICD-10-CM

## 2019-07-03 DIAGNOSIS — J02.9 SORE THROAT: ICD-10-CM

## 2019-07-03 LAB
INT CON NEG: NEGATIVE
INT CON POS: POSITIVE
S PYO AG THROAT QL: NEGATIVE

## 2019-07-03 PROCEDURE — 99213 OFFICE O/P EST LOW 20 MIN: CPT | Performed by: NURSE PRACTITIONER

## 2019-07-03 PROCEDURE — 87880 STREP A ASSAY W/OPTIC: CPT | Performed by: NURSE PRACTITIONER

## 2019-07-03 ASSESSMENT — ENCOUNTER SYMPTOMS
PALPITATIONS: 0
ABDOMINAL PAIN: 0
EYE DISCHARGE: 0
TINGLING: 0
SENSORY CHANGE: 0
CHILLS: 0
COUGH: 0
SORE THROAT: 1
NECK PAIN: 0
WHEEZING: 0
FOCAL WEAKNESS: 0
DIARRHEA: 0
DIZZINESS: 0
CONSTIPATION: 0
BLOOD IN STOOL: 0
HEADACHES: 0
EYE REDNESS: 0
FLANK PAIN: 0
VOMITING: 0
SHORTNESS OF BREATH: 0
MYALGIAS: 0
NAUSEA: 0
WEAKNESS: 0
FEVER: 0
BACK PAIN: 0

## 2019-07-03 NOTE — PROGRESS NOTES
"Subjective:      Britt Subramanian is a 48 y.o. female who presents with Pharyngitis (sore throat x 2 days, recent hospital stay in march, needs clearance to see dentist)            HPI  Sore throat x 2 days. \"Hot/cold\" flashes last night. Denies n/v/d, no abdominal pain, no hematuria, dysuria. Denies recent illness, cough or fever. Unknown exposure to strep. At dentist this morning, states had low blood pressure 106/53, 120 HR. Dizziness 1x this morning. Egg whites, [employer]bucks and 1 glass of water this morning. Denies CP/pressure or palpitations.    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 She also has no past medical history of Anxiety; Arrhythmia; Blood transfusion without reported diagnosis; Cancer ([facility]); CHF (congestive heart failure) ([facility]); Clotting disorder ([facility]); COPD (chronic obstructive pulmonary disease) ([facility]); Depression; Diabetic neuropathy ([facility]); Hypertension; IBD (inflammatory bowel disease); Kidney disease; Seizure ([facility]); Substance abuse ([facility]); or Thyroid disease.  MEDS:   Current Outpatient Prescriptions:   •  metFORMIN ER (GLUCOPHAGE XR) 500 MG TABLET SR 24 HR, Take 1 Tab by mouth every day., Disp: 90 Tab, Rfl: 1  •  lisdexamfetamine (VYVANSE) 70 MG capsule, Take 1 Cap by mouth every morning for 30 days., Disp: 30 Cap, Rfl: 0  •  [START ON 7/11/2019] lisdexamfetamine (VYVANSE) 70 MG capsule, Take 1 Cap by mouth every morning for 30 days., Disp: 30 Cap, Rfl: 0  ALLERGIES:   Allergies   Allergen Reactions   • Gluten Meal Unspecified     Pt has celiac    • Tylenol Shortness of Breath     Pt states, \"it makes me wheeze\"   • Ibuprofen      SURGHX:   Past Surgical History:   Procedure Laterality " "Date   • HYSTERECTOMY, VAGINAL  2010    heavy menses   • PB ENLARGE BREAST WITH IMPLANT  2009   • PB REDUCTION OF LARGE BREAST  2009   • APPENDECTOMY  1999   • ABDOMINAL HYSTERECTOMY TOTAL     • LUMPECTOMY      breast implants     SOCHX:  reports that she has never smoked. She has never used smokeless tobacco. She reports that she drinks alcohol. She reports that she does not use drugs.  FH: Family history was reviewed, no pertinent findings to report    Review of Systems   Constitutional: Negative for chills, fever and malaise/fatigue.   HENT: Positive for sore throat. Negative for congestion and ear pain.    Eyes: Negative for discharge and redness.   Respiratory: Negative for cough, shortness of breath and wheezing.    Cardiovascular: Negative for chest pain and palpitations.   Gastrointestinal: Negative for abdominal pain, blood in stool, constipation, diarrhea, nausea and vomiting.   Genitourinary: Negative for dysuria, flank pain, frequency, hematuria and urgency.   Musculoskeletal: Negative for back pain, myalgias and neck pain.   Skin: Negative for itching and rash.   Neurological: Negative for dizziness, tingling, sensory change, focal weakness, weakness and headaches.   Endo/Heme/Allergies: Negative for environmental allergies.   All other systems reviewed and are negative.         Objective:     /84 (BP Location: Left arm, Patient Position: Sitting, BP Cuff Size: Adult)   Pulse 88   Temp 36.8 °C (98.3 °F) (Temporal)   Resp 20   Ht 1.727 m (5' 8\")   Wt 67.1 kg (148 lb)   SpO2 100%   BMI 22.50 kg/m²      Physical Exam   Constitutional: She is oriented to person, place, and time. Vital signs are normal. She appears well-developed and well-nourished. She is active and cooperative.  Non-toxic appearance. She does not have a sickly appearance. She does not appear ill. No distress.   HENT:   Head: Normocephalic.   Right Ear: External ear and ear canal normal. A middle ear effusion is present.   Left " Ear: External ear and ear canal normal. A middle ear effusion is present.   Nose: Mucosal edema present. No rhinorrhea or sinus tenderness.   Mouth/Throat: Uvula is midline and mucous membranes are normal. No uvula swelling. No posterior oropharyngeal edema.   Mildly red oropharynx.   Eyes: Pupils are equal, round, and reactive to light. Conjunctivae and EOM are normal.   Neck: Normal range of motion. Neck supple. No JVD present.   Cardiovascular: Normal rate, regular rhythm, S1 normal, S2 normal, normal heart sounds and normal pulses.  Exam reveals no gallop and no friction rub.    No murmur heard.  Pulses:       Radial pulses are 2+ on the right side, and 2+ on the left side.   Pulmonary/Chest: Effort normal and breath sounds normal. No accessory muscle usage. No respiratory distress. She has no decreased breath sounds. She has no wheezes. She has no rhonchi. She has no rales.   Musculoskeletal: Normal range of motion.   Neurological: She is alert and oriented to person, place, and time. She has normal strength. No cranial nerve deficit or sensory deficit. Coordination and gait normal. GCS eye subscore is 4. GCS verbal subscore is 5. GCS motor subscore is 6.   Skin: Skin is warm and dry. She is not diaphoretic.   Psychiatric: She has a normal mood and affect. Her speech is normal and behavior is normal. Judgment and thought content normal. She is not actively hallucinating. Cognition and memory are normal. She is attentive.   Vitals reviewed.         Declines CBC at this time.   Repeat vitals at discharge  Assessment/Plan:     1. Sore throat    - POCT Rapid Strep A; NEG    2. Physically well but worried    Monitor for any changes in health status in regards to cough, fever, any URI symptoms, may return for re-evaluation

## 2019-07-03 NOTE — ASSESSMENT & PLAN NOTE
Patient presents to the office today for a follow-up on ADHD. This is a patient of April Zuluaga. This is my first time seeing patient for this issue. Britt states that she first started taking Vyvanse during her master's program. She was feeling very overwhelmed with all of the studying and felt unable to cope or concentrate on anything. The medication worked very well for her and she used it for 2 years. Her insurance then stop paying for the medication so she stopped it. 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 She states that her job is very demanding with lots of complicated documentation which she was having problems doing. Her PCP put her back on the Vyvanse at the end of January at a 30 mg dose. She was increased to the 70 mg dose that she was on previously at the end of last month and she is here today for refills. She states that she is tolerating the medication very well and that it works quite well for her symptoms. It allows her to do her job in the way that it needs to be done. The only side effect that she has had is a bit of fogginess first thing in the morning. She denies any excessive sedation, dizziness, chest pain, palpitations, or headaches. She is currently taking the Vyvanse first thing in the morning on her way to work, typically between 5:45 and 6 AM. She endorses a strong family history of ADHD. Both her son and daughter are actually on Vyvanse and her father has ADHD as well.   No pertinent family history in first degree relatives

## 2019-07-26 DIAGNOSIS — F90.9 ADULT ADHD (ATTENTION DEFICIT HYPERACTIVITY DISORDER): ICD-10-CM

## 2019-07-26 RX ORDER — LISDEXAMFETAMINE DIMESYLATE CAPSULES 70 MG/1
70 CAPSULE ORAL EVERY MORNING
Qty: 30 CAP | Refills: 2 | Status: SHIPPED | OUTPATIENT
Start: 2019-07-26 | End: 2019-08-25

## 2019-09-18 ENCOUNTER — APPOINTMENT (OUTPATIENT)
Dept: BEHAVIORAL HEALTH | Facility: CLINIC | Age: 49
End: 2019-09-18
Payer: COMMERCIAL

## 2019-11-09 ENCOUNTER — HOSPITAL ENCOUNTER (OUTPATIENT)
Facility: MEDICAL CENTER | Age: 49
End: 2019-11-09
Attending: FAMILY MEDICINE
Payer: COMMERCIAL

## 2019-11-09 ENCOUNTER — OFFICE VISIT (OUTPATIENT)
Dept: URGENT CARE | Facility: CLINIC | Age: 49
End: 2019-11-09
Payer: COMMERCIAL

## 2019-11-09 VITALS
WEIGHT: 149.6 LBS | SYSTOLIC BLOOD PRESSURE: 112 MMHG | HEIGHT: 69 IN | BODY MASS INDEX: 22.16 KG/M2 | DIASTOLIC BLOOD PRESSURE: 76 MMHG | HEART RATE: 111 BPM | OXYGEN SATURATION: 95 % | TEMPERATURE: 98.3 F | RESPIRATION RATE: 12 BRPM

## 2019-11-09 DIAGNOSIS — Z71.1 CONCERN ABOUT STD IN FEMALE WITHOUT DIAGNOSIS: ICD-10-CM

## 2019-11-09 DIAGNOSIS — R53.83 FATIGUE, UNSPECIFIED TYPE: ICD-10-CM

## 2019-11-09 LAB
APPEARANCE UR: CLEAR
BILIRUB UR STRIP-MCNC: NEGATIVE MG/DL
COLOR UR AUTO: YELLOW
GLUCOSE UR STRIP.AUTO-MCNC: NEGATIVE MG/DL
KETONES UR STRIP.AUTO-MCNC: NORMAL MG/DL
LEUKOCYTE ESTERASE UR QL STRIP.AUTO: NORMAL
NITRITE UR QL STRIP.AUTO: NEGATIVE
PH UR STRIP.AUTO: 5 [PH] (ref 5–8)
PROT UR QL STRIP: NEGATIVE MG/DL
RBC UR QL AUTO: NORMAL
SP GR UR STRIP.AUTO: 1.03
UROBILINOGEN UR STRIP-MCNC: 0.2 MG/DL

## 2019-11-09 PROCEDURE — 87510 GARDNER VAG DNA DIR PROBE: CPT

## 2019-11-09 PROCEDURE — 81002 URINALYSIS NONAUTO W/O SCOPE: CPT | Performed by: FAMILY MEDICINE

## 2019-11-09 PROCEDURE — 99000 SPECIMEN HANDLING OFFICE-LAB: CPT | Performed by: FAMILY MEDICINE

## 2019-11-09 PROCEDURE — 87491 CHLMYD TRACH DNA AMP PROBE: CPT

## 2019-11-09 PROCEDURE — 99214 OFFICE O/P EST MOD 30 MIN: CPT | Performed by: FAMILY MEDICINE

## 2019-11-09 PROCEDURE — 87086 URINE CULTURE/COLONY COUNT: CPT

## 2019-11-09 PROCEDURE — 87660 TRICHOMONAS VAGIN DIR PROBE: CPT

## 2019-11-09 PROCEDURE — 87591 N.GONORRHOEAE DNA AMP PROB: CPT

## 2019-11-09 PROCEDURE — 87480 CANDIDA DNA DIR PROBE: CPT

## 2019-11-09 ASSESSMENT — ENCOUNTER SYMPTOMS
VOMITING: 0
FEVER: 0
HEADACHES: 0
SORE THROAT: 0
COUGH: 0
MYALGIAS: 0

## 2019-11-10 DIAGNOSIS — R53.83 FATIGUE, UNSPECIFIED TYPE: ICD-10-CM

## 2019-11-10 DIAGNOSIS — Z71.1 CONCERN ABOUT STD IN FEMALE WITHOUT DIAGNOSIS: ICD-10-CM

## 2019-11-10 LAB
CANDIDA DNA VAG QL PROBE+SIG AMP: NEGATIVE
G VAGINALIS DNA VAG QL PROBE+SIG AMP: NEGATIVE
T VAGINALIS DNA VAG QL PROBE+SIG AMP: NEGATIVE

## 2019-11-10 NOTE — PROGRESS NOTES
Subjective:     Britt Subramanian is a 49 y.o. female who presents for Sexually Transmitted Diseases (x 2 days. Pt. would like a STD test.)    HPI  Pt presents for evaluation of a new problem   Pt here for STD testing   Pt feeling fatigued for the past 1 week   Was evaluated in [facility] 1 week ago and given fluids and magnesium   All testing was WNL at that time   Pt feeling better with some fluids   She is feeling fatigued again and is concerned she could have contracted an STD  Patient is sexually monogamous with boyfriend as far she knows  Patient has been traveling back and forth between here in [place] where she is currently in the process of moving to  Patient does endorse very high stress the past 2 weeks due to the move and work  Patient has history of urosepsis which resulted in a hospital visit, and very nervous this could be same thing    Review of Systems   Constitutional: Positive for malaise/fatigue. Negative for fever.   HENT: Negative for sore throat.    Respiratory: Negative for cough.    Cardiovascular: Negative for chest pain.   Gastrointestinal: Negative for vomiting.   Musculoskeletal: Negative for myalgias.   Skin: Negative for rash.   Neurological: Negative for headaches.       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 She also has no past medical history of Anxiety, Arrhythmia, Blood transfusion without reported diagnosis, Cancer ([facility]), CHF (congestive heart failure) ([facility]), Clotting disorder ([facility]), COPD (chronic obstructive pulmonary disease) ([facility]), Depression, Diabetic neuropathy ([facility]), Hypertension, IBD (inflammatory bowel disease), Kidney disease, Seizure ([facility]), Substance abuse  "(HCC), or Thyroid disease.  MEDS:   Current Outpatient Medications:   •  metFORMIN ER (GLUCOPHAGE XR) 500 MG TABLET SR 24 HR, Take 1 Tab by mouth every day., Disp: 90 Tab, Rfl: 1  ALLERGIES:   Allergies   Allergen Reactions   • Gluten Meal Unspecified     Pt has celiac    • Tylenol Shortness of Breath     Pt states, \"it makes me wheeze\"   • Ibuprofen      SURGHX:   Past Surgical History:   Procedure Laterality Date   • HYSTERECTOMY, VAGINAL  2010    heavy menses   • PB ENLARGE BREAST WITH IMPLANT  2009   • PB REDUCTION OF LARGE BREAST  2009   • APPENDECTOMY  1999   • ABDOMINAL HYSTERECTOMY TOTAL     • LUMPECTOMY      breast implants     SOCHX:  reports that she has never smoked. She has never used smokeless tobacco. She reports current alcohol use. She reports that she does not use drugs.  FH: Family history was reviewed, not contributing to acute complaint     Objective:   /76   Pulse (!) 111   Temp 36.8 °C (98.3 °F) (Temporal)   Resp 12   Ht 1.753 m (5' 9\")   Wt 67.9 kg (149 lb 9.6 oz)   LMP  (LMP Unknown)   SpO2 95%   BMI 22.09 kg/m²     Physical Exam  Constitutional:       General: She is not in acute distress.     Appearance: She is well-developed. She is not diaphoretic.   HENT:      Head: Normocephalic and atraumatic.      Right Ear: External ear normal.      Left Ear: External ear normal.      Nose: Nose normal.   Eyes:      General: No scleral icterus.        Right eye: No discharge.         Left eye: No discharge.      Conjunctiva/sclera: Conjunctivae normal.   Neck:      Musculoskeletal: Normal range of motion.      Trachea: No tracheal deviation.   Cardiovascular:      Rate and Rhythm: Normal rate and regular rhythm.   Pulmonary:      Effort: Pulmonary effort is normal. No respiratory distress.      Breath sounds: Normal breath sounds. No wheezing or rales.   Skin:     General: Skin is warm and dry.      Findings: No rash.   Neurological:      Mental Status: She is alert and oriented to " person, place, and time. Mental status is at baseline.   Psychiatric:      Comments:   Anxious         Assessment/Plan:   Assessment    1. Fatigue, unspecified type  2. Concern about STD in female without diagnosis  - POCT Urinalysis  - URINE CULTURE(NEW); Future  - CHLAMYDIA/GC PCR URINE OR SWAB; Future  - VAGINAL PATHOGENS DNA PANEL; Future    Patient is a 49-year-old female with fatigue and concern for STD.  She was previously evaluated in ER 1 week ago with same symptoms and blood testing was all within normal limits.  Patient would like STD testing and sent gonorrhea/chlamydia as well as vaginal path panel.  Advised that there is a possibility all of her symptoms are due to her high stress and emphasized importance of trying to get some sleep and drinking plenty of water.  Will follow-up lab results.  Patient encouraged to follow-up sooner if symptoms are worsening.

## 2019-11-12 LAB
BACTERIA UR CULT: NORMAL
C TRACH DNA SPEC QL NAA+PROBE: NEGATIVE
N GONORRHOEA DNA SPEC QL NAA+PROBE: NEGATIVE
SIGNIFICANT IND 70042: NORMAL
SITE SITE: NORMAL
SOURCE SOURCE: NORMAL
SPECIMEN SOURCE: NORMAL

## 2019-11-14 ENCOUNTER — TELEPHONE (OUTPATIENT)
Dept: URGENT CARE | Facility: PHYSICIAN GROUP | Age: 49
End: 2019-11-14

## 2019-11-14 ENCOUNTER — HOSPITAL ENCOUNTER (OUTPATIENT)
Dept: RADIOLOGY | Facility: MEDICAL CENTER | Age: 49
End: 2019-11-14
Attending: NURSE PRACTITIONER
Payer: COMMERCIAL

## 2019-11-14 ENCOUNTER — OFFICE VISIT (OUTPATIENT)
Dept: URGENT CARE | Facility: PHYSICIAN GROUP | Age: 49
End: 2019-11-14
Payer: COMMERCIAL

## 2019-11-14 ENCOUNTER — HOSPITAL ENCOUNTER (OUTPATIENT)
Dept: LAB | Facility: MEDICAL CENTER | Age: 49
End: 2019-11-14
Attending: NURSE PRACTITIONER
Payer: COMMERCIAL

## 2019-11-14 VITALS
TEMPERATURE: 99.8 F | WEIGHT: 148 LBS | RESPIRATION RATE: 20 BRPM | BODY MASS INDEX: 21.92 KG/M2 | HEART RATE: 110 BPM | OXYGEN SATURATION: 95 % | HEIGHT: 69 IN | SYSTOLIC BLOOD PRESSURE: 110 MMHG | DIASTOLIC BLOOD PRESSURE: 72 MMHG

## 2019-11-14 DIAGNOSIS — R53.83 MALAISE AND FATIGUE: ICD-10-CM

## 2019-11-14 DIAGNOSIS — R50.9 FEVER, UNSPECIFIED FEVER CAUSE: ICD-10-CM

## 2019-11-14 DIAGNOSIS — R53.81 MALAISE AND FATIGUE: ICD-10-CM

## 2019-11-14 DIAGNOSIS — R07.81 PLEURITIC CHEST PAIN: ICD-10-CM

## 2019-11-14 LAB
ALBUMIN SERPL BCP-MCNC: 4.3 G/DL (ref 3.2–4.9)
ALBUMIN/GLOB SERPL: 1.4 G/DL
ALP SERPL-CCNC: 67 U/L (ref 30–99)
ALT SERPL-CCNC: 68 U/L (ref 2–50)
ANION GAP SERPL CALC-SCNC: 9 MMOL/L (ref 0–11.9)
AST SERPL-CCNC: 39 U/L (ref 12–45)
BASOPHILS # BLD AUTO: 0.3 % (ref 0–1.8)
BASOPHILS # BLD: 0.03 K/UL (ref 0–0.12)
BILIRUB SERPL-MCNC: 0.8 MG/DL (ref 0.1–1.5)
BUN SERPL-MCNC: 10 MG/DL (ref 8–22)
CALCIUM SERPL-MCNC: 9.3 MG/DL (ref 8.5–10.5)
CHLORIDE SERPL-SCNC: 104 MMOL/L (ref 96–112)
CO2 SERPL-SCNC: 25 MMOL/L (ref 20–33)
CREAT SERPL-MCNC: 0.86 MG/DL (ref 0.5–1.4)
EOSINOPHIL # BLD AUTO: 0.56 K/UL (ref 0–0.51)
EOSINOPHIL NFR BLD: 5.7 % (ref 0–6.9)
ERYTHROCYTE [DISTWIDTH] IN BLOOD BY AUTOMATED COUNT: 43.3 FL (ref 35.9–50)
GLOBULIN SER CALC-MCNC: 3.1 G/DL (ref 1.9–3.5)
GLUCOSE SERPL-MCNC: 105 MG/DL (ref 65–99)
HCT VFR BLD AUTO: 45.1 % (ref 37–47)
HETEROPH AB SER QL LA: NEGATIVE
HGB BLD-MCNC: 15.1 G/DL (ref 12–16)
IMM GRANULOCYTES # BLD AUTO: 0.04 K/UL (ref 0–0.11)
IMM GRANULOCYTES NFR BLD AUTO: 0.4 % (ref 0–0.9)
INT CON NEG: NEGATIVE
INT CON NEG: NEGATIVE
INT CON POS: POSITIVE
INT CON POS: POSITIVE
LYMPHOCYTES # BLD AUTO: 0.63 K/UL (ref 1–4.8)
LYMPHOCYTES NFR BLD: 6.4 % (ref 22–41)
MCH RBC QN AUTO: 31.5 PG (ref 27–33)
MCHC RBC AUTO-ENTMCNC: 33.5 G/DL (ref 33.6–35)
MCV RBC AUTO: 94 FL (ref 81.4–97.8)
MONOCYTES # BLD AUTO: 0.28 K/UL (ref 0–0.85)
MONOCYTES NFR BLD AUTO: 2.9 % (ref 0–13.4)
NEUTROPHILS # BLD AUTO: 8.25 K/UL (ref 2–7.15)
NEUTROPHILS NFR BLD: 84.3 % (ref 44–72)
NRBC # BLD AUTO: 0 K/UL
NRBC BLD-RTO: 0 /100 WBC
PLATELET # BLD AUTO: 294 K/UL (ref 164–446)
PMV BLD AUTO: 10.9 FL (ref 9–12.9)
POTASSIUM SERPL-SCNC: 4 MMOL/L (ref 3.6–5.5)
PROT SERPL-MCNC: 7.4 G/DL (ref 6–8.2)
RBC # BLD AUTO: 4.8 M/UL (ref 4.2–5.4)
S PYO AG THROAT QL: NEGATIVE
SODIUM SERPL-SCNC: 138 MMOL/L (ref 135–145)
TSH SERPL DL<=0.005 MIU/L-ACNC: 0.65 UIU/ML (ref 0.38–5.33)
WBC # BLD AUTO: 9.8 K/UL (ref 4.8–10.8)

## 2019-11-14 PROCEDURE — 71046 X-RAY EXAM CHEST 2 VIEWS: CPT

## 2019-11-14 PROCEDURE — 99214 OFFICE O/P EST MOD 30 MIN: CPT | Performed by: NURSE PRACTITIONER

## 2019-11-14 PROCEDURE — 85025 COMPLETE CBC W/AUTO DIFF WBC: CPT

## 2019-11-14 PROCEDURE — 80053 COMPREHEN METABOLIC PANEL: CPT

## 2019-11-14 PROCEDURE — 84443 ASSAY THYROID STIM HORMONE: CPT

## 2019-11-14 PROCEDURE — 87880 STREP A ASSAY W/OPTIC: CPT | Performed by: NURSE PRACTITIONER

## 2019-11-14 PROCEDURE — 86308 HETEROPHILE ANTIBODY SCREEN: CPT | Performed by: NURSE PRACTITIONER

## 2019-11-14 PROCEDURE — 36415 COLL VENOUS BLD VENIPUNCTURE: CPT

## 2019-11-14 ASSESSMENT — ENCOUNTER SYMPTOMS
VOMITING: 0
CHILLS: 1
FEVER: 1
ABDOMINAL PAIN: 0
ANOREXIA: 1
SHORTNESS OF BREATH: 0
COUGH: 0
FATIGUE: 1
DIZZINESS: 0
HEADACHES: 0
NAUSEA: 1
HEARTBURN: 0

## 2019-11-14 NOTE — PROGRESS NOTES
Subjective:      Britt Subramanian is a 49 y.o. female who presents with Body Aches (Chills, body aches, nausea, chest/lung Px, x 5 days)            Fatigue   This is a recurrent problem. Episode onset: 3 weeks. The problem occurs intermittently. The problem has been gradually worsening. Associated symptoms include anorexia, chills, fatigue, a fever and nausea. Pertinent negatives include no abdominal pain, coughing, headaches or vomiting. Associated symptoms comments: She admits to body aches, chills, nausea and chest and lung pain when she breathes.  Was worked up in the urgent care who did STD testing urinalysis and urine culture all were negative.  Patient also states she was worked up at [facility] and was told that all of her testing was negative.  Patient is having ongoing symptoms. Nothing aggravates the symptoms. She has tried lying down and rest for the symptoms.       Review of Systems   Constitutional: Positive for chills, fatigue, fever and malaise/fatigue.   Respiratory: Negative for cough and shortness of breath.    Gastrointestinal: Positive for anorexia and nausea. Negative for abdominal pain, heartburn and vomiting.   Neurological: Negative for dizziness and headaches.     Past Medical History:   Diagnosis Date   • ADHD    • Cardiac murmur 3/27/2019   • Celiac disease    • Celiac disease 6/22/2018    Diagnosed by GI in [place] by official testing and endoscopy. Doing well with gluten free diet.   • Diabetes (HCC)    • GERD (gastroesophageal reflux disease)    • History of insulin resistance 2012   • Mild persistent asthma without complication 6/22/2018    Exercise induced asthma. Only with strenuous exercise. PRN Albuterol inhaler.    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    • Urinary tract infection    • Vitamin D deficiency 6/22/2018    Taking OTC.       Past Surgical History:    Procedure Laterality Date   • HYSTERECTOMY, VAGINAL  2010    heavy menses   • PB ENLARGE BREAST WITH IMPLANT  2009   • PB REDUCTION OF LARGE BREAST  2009   • APPENDECTOMY  1999   • ABDOMINAL HYSTERECTOMY TOTAL     • LUMPECTOMY      breast implants      Social History     Socioeconomic History   • Marital status:      Spouse name: Not on file   • Number of children: 3   • Years of education: Not on file   • Highest education level: Not on file   Occupational History   • Occupation: [employer]     Employer: [employer]   Social Needs   • Financial resource strain: Not on file   • Food insecurity:     Worry: Not on file     Inability: Not on file   • Transportation needs:     Medical: Not on file     Non-medical: Not on file   Tobacco Use   • Smoking status: Never Smoker   • Smokeless tobacco: Never Used   Substance and Sexual Activity   • Alcohol use: Yes     Comment: occasional   • Drug use: No   • Sexual activity: Not Currently     Partners: Male     Comment: Lives with her son. Works full time. [employer]. No social or domestic concerns.   Lifestyle   • Physical activity:     Days per week: Not on file     Minutes per session: Not on file   • Stress: Not on file   Relationships   • Social connections:     Talks on phone: Not on file     Gets together: Not on file     Attends [employer] service: Not on file     Active member of club or organization: Not on file     Attends meetings of clubs or organizations: Not on file     Relationship status: Not on file   • Intimate partner violence:     Fear of current or ex partner: Not on file     Emotionally abused: Not on file     Physically abused: Not on file     Forced sexual activity: Not on file   Other Topics Concern   • Not on file   Social History Narrative   • Not on file    Allergies: Gluten meal; Tylenol; and Ibuprofen           Objective:     /72 (BP Location: Left arm, Patient Position: Sitting, BP Cuff Size: Adult)   Pulse (!) 110    "Temp 37.7 °C (99.8 °F) (Temporal)   Resp 20   Ht 1.753 m (5' 9\")   Wt 67.1 kg (148 lb)   LMP  (LMP Unknown)   SpO2 95%   BMI 21.86 kg/m²      Physical Exam  Vitals signs reviewed.   Constitutional:       Appearance: Normal appearance.   HENT:      Head: Normocephalic.      Right Ear: Tympanic membrane, ear canal and external ear normal.      Left Ear: Tympanic membrane, ear canal and external ear normal.      Nose: Nose normal.      Mouth/Throat:      Lips: Pink.      Pharynx: Uvula midline.   Neck:      Musculoskeletal: Normal range of motion.   Cardiovascular:      Rate and Rhythm: Regular rhythm. Tachycardia present.      Heart sounds: Normal heart sounds.   Pulmonary:      Effort: Pulmonary effort is normal.      Breath sounds: Normal breath sounds.   Lymphadenopathy:      Cervical: Cervical adenopathy present.   Skin:     General: Skin is warm.          Neurological:      Mental Status: She is alert and oriented to person, place, and time.   Psychiatric:         Mood and Affect: Mood normal.         Behavior: Behavior normal.         Thought Content: Thought content normal.         Judgment: Judgment normal.                 Assessment/Plan:       1. Fever, unspecified fever cause  - POCT Mononucleosis (mono) - Negative  - POCT Rapid Strep A - Negative  - CBC WITH DIFFERENTIAL; Future  - Comp Metabolic Panel; Future  - TSH; Future    2. Pleuritic chest pain  - DX-CHEST-2 VIEWS; Future  FINDINGS:  Cardiomediastinal contour is within normal limits.  No focal pulmonary consolidation.  No pleural fluid collection or pneumothorax.  Accentuated kyphosis of thoracic spine.     IMPRESSION:     No acute cardiopulmonary disease.  Reviewed images, agree with radiology. Discussed with patient    3. Malaise and fatigue  - CBC WITH DIFFERENTIAL; Future  - Comp Metabolic Panel; Future  - TSH; Future    Discussed physical examination findings as well as benign findings of POCT testing as well as x-ray imaging today " discussed additional lab orders with patient to evaluate infectious process going on.  Instructed patient to try and follow-up with primary care allowing for availability of baseline labs.  Told patient I would call with results to blood testing later today.    Instructed patient to increase fluids may take over-the-counter NSAIDs and Tylenol per 's instructions for pain and fever    Supportive care, differential diagnoses, and indications for immediate follow-up discussed with patient.    Pathogenesis of diagnosis discussed including typical length and natural progression. Patient expresses understanding and agrees to plan.       Please note that this dictation was created using voice recognition software. I have made every reasonable attempt to correct obvious errors, but I expect that there are errors of grammar and possibly content that I did not discover before finalizing the note.

## 2019-11-15 NOTE — TELEPHONE ENCOUNTER
Spoke to patient regarding lab results. Suggested she follow up with primary care in for further work up due to benign findings from POCT testing and labs.     Patient states she has an appointment on Tuesday.

## 2022-09-30 NOTE — HPI: SECONDARY COMPLAINT
How Severe Is This Condition?: moderate
Additional History: Patient has noticed changes in the last few years.
English

## 2024-07-31 NOTE — ASSESSMENT & PLAN NOTE
New to me, chronic controlled.  She stated that her previous provider told her that she has insulin resistance are in the prediabetic range.  She was started on metformin 500 mg twice a day.  She has not used it for many months and recently restarted about 3 weeks ago.  She would like to be rechecked to see her diabetes control.     skin suture